# Patient Record
Sex: MALE | Race: BLACK OR AFRICAN AMERICAN | NOT HISPANIC OR LATINO | Employment: FULL TIME | URBAN - METROPOLITAN AREA
[De-identification: names, ages, dates, MRNs, and addresses within clinical notes are randomized per-mention and may not be internally consistent; named-entity substitution may affect disease eponyms.]

---

## 2022-05-09 ENCOUNTER — TELEPHONE (OUTPATIENT)
Dept: PULMONOLOGY | Facility: CLINIC | Age: 22
End: 2022-05-09
Payer: COMMERCIAL

## 2022-05-09 NOTE — TELEPHONE ENCOUNTER
----- Message from Melani Romero sent at 5/9/2022 12:41 PM CDT -----  Contact: @ [phone] Katt  Pt called in looking to make an appointment no dates available his complaint is shortness of breath please advise and call @ [phone] Katt

## 2022-05-09 NOTE — TELEPHONE ENCOUNTER
Spoke with patient mother (Ms Bolivar) informed her that I have received her message. I advised patient mother that we do not have any available appointments at this time but however, I will speak with physician to see if we can possibly squeeze patient appointment in. Patient mother verbalized that she understand.

## 2022-07-28 ENCOUNTER — TELEPHONE (OUTPATIENT)
Dept: INTERNAL MEDICINE | Facility: CLINIC | Age: 22
End: 2022-07-28
Payer: MEDICAID

## 2022-07-28 NOTE — TELEPHONE ENCOUNTER
Dr campo does not have a panel   She is a preceptor for residents   Mother will call to get a staff doctor

## 2022-08-24 ENCOUNTER — OFFICE VISIT (OUTPATIENT)
Dept: GASTROENTEROLOGY | Facility: CLINIC | Age: 22
End: 2022-08-24
Payer: COMMERCIAL

## 2022-08-24 ENCOUNTER — TELEPHONE (OUTPATIENT)
Dept: ENDOSCOPY | Facility: HOSPITAL | Age: 22
End: 2022-08-24
Payer: MEDICAID

## 2022-08-24 ENCOUNTER — LAB VISIT (OUTPATIENT)
Dept: LAB | Facility: HOSPITAL | Age: 22
End: 2022-08-24
Attending: INTERNAL MEDICINE
Payer: COMMERCIAL

## 2022-08-24 VITALS
BODY MASS INDEX: 21.66 KG/M2 | DIASTOLIC BLOOD PRESSURE: 79 MMHG | HEIGHT: 67 IN | WEIGHT: 138 LBS | SYSTOLIC BLOOD PRESSURE: 127 MMHG | HEART RATE: 62 BPM

## 2022-08-24 DIAGNOSIS — R19.7 DIARRHEA, UNSPECIFIED TYPE: Primary | ICD-10-CM

## 2022-08-24 DIAGNOSIS — R19.7 DIARRHEA, UNSPECIFIED TYPE: ICD-10-CM

## 2022-08-24 DIAGNOSIS — K92.1 HEMATOCHEZIA: ICD-10-CM

## 2022-08-24 LAB
ALBUMIN SERPL BCP-MCNC: 4.3 G/DL (ref 3.5–5.2)
ALP SERPL-CCNC: 62 U/L (ref 55–135)
ALT SERPL W/O P-5'-P-CCNC: 19 U/L (ref 10–44)
ANION GAP SERPL CALC-SCNC: 9 MMOL/L (ref 8–16)
AST SERPL-CCNC: 19 U/L (ref 10–40)
BASOPHILS # BLD AUTO: 0.04 K/UL (ref 0–0.2)
BASOPHILS NFR BLD: 0.7 % (ref 0–1.9)
BILIRUB SERPL-MCNC: 1.8 MG/DL (ref 0.1–1)
BUN SERPL-MCNC: 9 MG/DL (ref 6–20)
CALCIUM SERPL-MCNC: 9.7 MG/DL (ref 8.7–10.5)
CHLORIDE SERPL-SCNC: 101 MMOL/L (ref 95–110)
CO2 SERPL-SCNC: 29 MMOL/L (ref 23–29)
CREAT SERPL-MCNC: 1 MG/DL (ref 0.5–1.4)
DIFFERENTIAL METHOD: NORMAL
EOSINOPHIL # BLD AUTO: 0.3 K/UL (ref 0–0.5)
EOSINOPHIL NFR BLD: 5.4 % (ref 0–8)
ERYTHROCYTE [DISTWIDTH] IN BLOOD BY AUTOMATED COUNT: 13.1 % (ref 11.5–14.5)
EST. GFR  (NO RACE VARIABLE): >60 ML/MIN/1.73 M^2
GLUCOSE SERPL-MCNC: 78 MG/DL (ref 70–110)
HCT VFR BLD AUTO: 45.5 % (ref 40–54)
HGB BLD-MCNC: 15.6 G/DL (ref 14–18)
IMM GRANULOCYTES # BLD AUTO: 0.01 K/UL (ref 0–0.04)
IMM GRANULOCYTES NFR BLD AUTO: 0.2 % (ref 0–0.5)
LYMPHOCYTES # BLD AUTO: 1.2 K/UL (ref 1–4.8)
LYMPHOCYTES NFR BLD: 21.5 % (ref 18–48)
MCH RBC QN AUTO: 30.1 PG (ref 27–31)
MCHC RBC AUTO-ENTMCNC: 34.3 G/DL (ref 32–36)
MCV RBC AUTO: 88 FL (ref 82–98)
MONOCYTES # BLD AUTO: 0.6 K/UL (ref 0.3–1)
MONOCYTES NFR BLD: 10.2 % (ref 4–15)
NEUTROPHILS # BLD AUTO: 3.5 K/UL (ref 1.8–7.7)
NEUTROPHILS NFR BLD: 62 % (ref 38–73)
NRBC BLD-RTO: 0 /100 WBC
PLATELET # BLD AUTO: 234 K/UL (ref 150–450)
PMV BLD AUTO: 11 FL (ref 9.2–12.9)
POTASSIUM SERPL-SCNC: 4.1 MMOL/L (ref 3.5–5.1)
PROT SERPL-MCNC: 7.1 G/DL (ref 6–8.4)
RBC # BLD AUTO: 5.19 M/UL (ref 4.6–6.2)
SODIUM SERPL-SCNC: 139 MMOL/L (ref 136–145)
WBC # BLD AUTO: 5.58 K/UL (ref 3.9–12.7)

## 2022-08-24 PROCEDURE — 1160F RVW MEDS BY RX/DR IN RCRD: CPT | Mod: CPTII,S$GLB,, | Performed by: INTERNAL MEDICINE

## 2022-08-24 PROCEDURE — 3008F BODY MASS INDEX DOCD: CPT | Mod: CPTII,S$GLB,, | Performed by: INTERNAL MEDICINE

## 2022-08-24 PROCEDURE — 99999 PR PBB SHADOW E&M-EST. PATIENT-LVL IV: CPT | Mod: PBBFAC,,, | Performed by: INTERNAL MEDICINE

## 2022-08-24 PROCEDURE — 1160F PR REVIEW ALL MEDS BY PRESCRIBER/CLIN PHARMACIST DOCUMENTED: ICD-10-PCS | Mod: CPTII,S$GLB,, | Performed by: INTERNAL MEDICINE

## 2022-08-24 PROCEDURE — 99999 PR PBB SHADOW E&M-EST. PATIENT-LVL IV: ICD-10-PCS | Mod: PBBFAC,,, | Performed by: INTERNAL MEDICINE

## 2022-08-24 PROCEDURE — 80053 COMPREHEN METABOLIC PANEL: CPT | Performed by: INTERNAL MEDICINE

## 2022-08-24 PROCEDURE — 3078F DIAST BP <80 MM HG: CPT | Mod: CPTII,S$GLB,, | Performed by: INTERNAL MEDICINE

## 2022-08-24 PROCEDURE — 99204 PR OFFICE/OUTPT VISIT, NEW, LEVL IV, 45-59 MIN: ICD-10-PCS | Mod: S$GLB,,, | Performed by: INTERNAL MEDICINE

## 2022-08-24 PROCEDURE — 3078F PR MOST RECENT DIASTOLIC BLOOD PRESSURE < 80 MM HG: ICD-10-PCS | Mod: CPTII,S$GLB,, | Performed by: INTERNAL MEDICINE

## 2022-08-24 PROCEDURE — 3074F PR MOST RECENT SYSTOLIC BLOOD PRESSURE < 130 MM HG: ICD-10-PCS | Mod: CPTII,S$GLB,, | Performed by: INTERNAL MEDICINE

## 2022-08-24 PROCEDURE — 3008F PR BODY MASS INDEX (BMI) DOCUMENTED: ICD-10-PCS | Mod: CPTII,S$GLB,, | Performed by: INTERNAL MEDICINE

## 2022-08-24 PROCEDURE — 1159F MED LIST DOCD IN RCRD: CPT | Mod: CPTII,S$GLB,, | Performed by: INTERNAL MEDICINE

## 2022-08-24 PROCEDURE — 99204 OFFICE O/P NEW MOD 45 MIN: CPT | Mod: S$GLB,,, | Performed by: INTERNAL MEDICINE

## 2022-08-24 PROCEDURE — 1159F PR MEDICATION LIST DOCUMENTED IN MEDICAL RECORD: ICD-10-PCS | Mod: CPTII,S$GLB,, | Performed by: INTERNAL MEDICINE

## 2022-08-24 PROCEDURE — 36415 COLL VENOUS BLD VENIPUNCTURE: CPT | Performed by: INTERNAL MEDICINE

## 2022-08-24 PROCEDURE — 3074F SYST BP LT 130 MM HG: CPT | Mod: CPTII,S$GLB,, | Performed by: INTERNAL MEDICINE

## 2022-08-24 PROCEDURE — 85025 COMPLETE CBC W/AUTO DIFF WBC: CPT | Performed by: INTERNAL MEDICINE

## 2022-08-24 RX ORDER — DIPHENOXYLATE HYDROCHLORIDE AND ATROPINE SULFATE 2.5; .025 MG/1; MG/1
1 TABLET ORAL 4 TIMES DAILY PRN
COMMUNITY
Start: 2022-08-19 | End: 2022-10-13

## 2022-08-24 RX ORDER — COVID-19 MOLECULAR TEST ASSAY
KIT MISCELLANEOUS
COMMUNITY
Start: 2022-03-02 | End: 2022-09-15

## 2022-08-24 RX ORDER — CLINDAMYCIN AND BENZOYL PEROXIDE 10; 50 MG/G; MG/G
GEL TOPICAL DAILY
COMMUNITY
Start: 2022-07-06 | End: 2022-09-15

## 2022-08-24 RX ORDER — LISDEXAMFETAMINE DIMESYLATE 30 MG/1
CAPSULE ORAL
Status: ON HOLD | COMMUNITY
End: 2022-08-31 | Stop reason: HOSPADM

## 2022-08-24 NOTE — PROGRESS NOTES
"[facility]    CC: hematochezia    HPI 22 y.o. male who presents with 2-3 weeks of new onset, moderate volume, painless hematochezia with associated diarrhea.    About 3 weeks ago he began to have diarrhea.  After one week he began to notice blood in his stool.  Now at times it is a significant amount of blood passing without stool.  He has mild abdominal discomfort in his mid abdomen.  He has never experienced symptoms like this before.    He denies nausea, vomiting, melena, dysphagia.  He may have had a small amount of weight loss.    Past Medical History  History reviewed. No pertinent past medical history.     No pertinent past surgical history    No pertinent family history of colon cancer      Review of Systems  General ROS: negative for chills, fever .  Positive for possible weight loss  Cardiovascular ROS: no chest pain or dyspnea on exertion  Gastrointestinal ROS: positive for diarrhea, hematochezia, no nausea    Physical Examination  /79   Pulse 62   Ht 5' 7" (1.702 m)   Wt 62.6 kg (138 lb)   BMI 21.61 kg/m²   General appearance: alert, cooperative, no distress  HENT: Normocephalic, atraumatic, neck symmetrical, no nasal discharge   Lungs: clear to auscultation bilaterally, no dullness to percussion bilaterally  Heart: regular rate and rhythm without rub; no displacement of the PMI   Abdomen: soft, non-tender; bowel sounds normoactive; no organomegaly  Extremities: extremities symmetric; no clubbing, cyanosis, or edema  Neurologic: Alert and oriented X 3, normal strength, normal coordination and gait    Labs:  5/11/20 Vitamin D 33.0        Assessment:   1. Diarrhea, unspecified type    2. Hematochezia        Plan:  -Check stool studies.  -Check CBC, CMP, CRP  -Schedule urgent colonoscopy for further evaluation.  -Ok to take Lomotil as needed.  If stool studies are positive then he will need to discontinue this.    Rosi Echevarria MD    "

## 2022-08-24 NOTE — TELEPHONE ENCOUNTER
Spoke with patient's mother. Informed that we will try to get him in if there is a cancellation or no show.

## 2022-08-24 NOTE — TELEPHONE ENCOUNTER
----- Message from Jessica Keene MA sent at 8/24/2022  1:34 PM CDT -----  Regarding: FW: Pt's Mother Katt called to see if the pt can be sooner today and she would like a call back asap    ----- Message -----  From: Jeannette Juan  Sent: 8/24/2022   1:19 PM CDT  To: Wale Aranda Staff  Subject: Pt's Mother Katt called to see if the pt c#    Same Day Appointment Access Request:    Pt's Mother Katt called to see if the pt can be sooner today for rectal bleeding and stomach pain and she would like a call back asap. Pt states that he is going to come in now.    Ms Hamilton can be reached at [phone]

## 2022-08-25 ENCOUNTER — TELEPHONE (OUTPATIENT)
Dept: GASTROENTEROLOGY | Facility: CLINIC | Age: 22
End: 2022-08-25
Payer: MEDICAID

## 2022-08-25 ENCOUNTER — LAB VISIT (OUTPATIENT)
Dept: LAB | Facility: HOSPITAL | Age: 22
End: 2022-08-25
Attending: INTERNAL MEDICINE
Payer: COMMERCIAL

## 2022-08-25 DIAGNOSIS — R19.7 DIARRHEA, UNSPECIFIED TYPE: ICD-10-CM

## 2022-08-25 DIAGNOSIS — R17 TOTAL BILIRUBIN, ELEVATED: Primary | ICD-10-CM

## 2022-08-25 LAB
C DIFF GDH STL QL: NEGATIVE
C DIFF TOX A+B STL QL IA: NEGATIVE

## 2022-08-25 PROCEDURE — 87046 STOOL CULTR AEROBIC BACT EA: CPT | Mod: 59 | Performed by: INTERNAL MEDICINE

## 2022-08-25 PROCEDURE — 87329 GIARDIA AG IA: CPT | Performed by: INTERNAL MEDICINE

## 2022-08-25 PROCEDURE — 87427 SHIGA-LIKE TOXIN AG IA: CPT | Performed by: INTERNAL MEDICINE

## 2022-08-25 PROCEDURE — 87045 FECES CULTURE AEROBIC BACT: CPT | Performed by: INTERNAL MEDICINE

## 2022-08-25 PROCEDURE — 87449 NOS EACH ORGANISM AG IA: CPT | Performed by: INTERNAL MEDICINE

## 2022-08-25 PROCEDURE — 83993 ASSAY FOR CALPROTECTIN FECAL: CPT | Performed by: INTERNAL MEDICINE

## 2022-08-26 ENCOUNTER — TELEPHONE (OUTPATIENT)
Dept: GASTROENTEROLOGY | Facility: CLINIC | Age: 22
End: 2022-08-26
Payer: MEDICAID

## 2022-08-26 ENCOUNTER — TELEPHONE (OUTPATIENT)
Dept: ENDOSCOPY | Facility: HOSPITAL | Age: 22
End: 2022-08-26
Payer: MEDICAID

## 2022-08-26 ENCOUNTER — PATIENT MESSAGE (OUTPATIENT)
Dept: GASTROENTEROLOGY | Facility: CLINIC | Age: 22
End: 2022-08-26
Payer: MEDICAID

## 2022-08-26 DIAGNOSIS — R19.7 DIARRHEA, UNSPECIFIED TYPE: Primary | ICD-10-CM

## 2022-08-26 DIAGNOSIS — A07.2 GASTROENTERITIS DUE TO CRYPTOSPORIDIUM: ICD-10-CM

## 2022-08-26 LAB
CRYPTOSP AG STL QL IA: POSITIVE
E COLI SXT1 STL QL IA: NEGATIVE
E COLI SXT2 STL QL IA: NEGATIVE
G LAMBLIA AG STL QL IA: NEGATIVE

## 2022-08-26 RX ORDER — NITAZOXANIDE 500 MG/1
500 TABLET ORAL EVERY 12 HOURS
Qty: 6 TABLET | Refills: 0 | Status: SHIPPED | OUTPATIENT
Start: 2022-08-26 | End: 2022-08-29

## 2022-08-26 NOTE — TELEPHONE ENCOUNTER
[facility]    Cryptosporidium antigen positive.  Will treat with Nitazoxanide for 3 days.      Rosi Echevarria MD

## 2022-08-26 NOTE — TELEPHONE ENCOUNTER
----- Message from Christine Sanders LPN sent at 8/26/2022 11:34 AM CDT -----    ----- Message -----  From: Nidia Ltuher  Sent: 8/26/2022  10:44 AM CDT  To: Reji Aranda Staff    Name Of Caller: Katt     Provider Name: REJI Aranda     Does patient feel the need to be seen today?    Relationship to the Pt?: mom     Contact Preference?: [phone]    What is the nature of the call?:Mom called and would like to speak to office directly regarding appt 10/4/22 please call her back.     He had a positive culture yesterday what is next she is asking.

## 2022-08-26 NOTE — TELEPHONE ENCOUNTER
[facility]    Results and plan discussed with the patient and his mother over the phone.    Rosi Echevarria MD

## 2022-08-29 ENCOUNTER — TELEPHONE (OUTPATIENT)
Dept: GASTROENTEROLOGY | Facility: CLINIC | Age: 22
End: 2022-08-29
Payer: MEDICAID

## 2022-08-29 ENCOUNTER — TELEPHONE (OUTPATIENT)
Dept: ENDOSCOPY | Facility: HOSPITAL | Age: 22
End: 2022-08-29
Payer: MEDICAID

## 2022-08-29 ENCOUNTER — PATIENT MESSAGE (OUTPATIENT)
Dept: ENDOSCOPY | Facility: HOSPITAL | Age: 22
End: 2022-08-29
Payer: MEDICAID

## 2022-08-29 DIAGNOSIS — R19.7 DIARRHEA, UNSPECIFIED TYPE: ICD-10-CM

## 2022-08-29 DIAGNOSIS — Z12.11 SCREEN FOR COLON CANCER: Primary | ICD-10-CM

## 2022-08-29 DIAGNOSIS — K92.1 HEMATOCHEZIA: ICD-10-CM

## 2022-08-29 DIAGNOSIS — R19.7 DIARRHEA, UNSPECIFIED TYPE: Primary | ICD-10-CM

## 2022-08-29 LAB — BACTERIA STL CULT: NORMAL

## 2022-08-29 RX ORDER — POLYETHYLENE GLYCOL 3350, SODIUM SULFATE ANHYDROUS, SODIUM BICARBONATE, SODIUM CHLORIDE, POTASSIUM CHLORIDE 236; 22.74; 6.74; 5.86; 2.97 G/4L; G/4L; G/4L; G/4L; G/4L
4 POWDER, FOR SOLUTION ORAL ONCE
Qty: 4000 ML | Refills: 0 | Status: SHIPPED | OUTPATIENT
Start: 2022-08-29 | End: 2022-08-29

## 2022-08-29 NOTE — TELEPHONE ENCOUNTER
August 29, 2022  Rosi Echevarria MD  to Me        10:12 AM   Alexis Jaffe,     Yes that is correct.  I had planned for a colonoscopy due to his hematochezia and diarrhea.     Sincerely,     Rosi        GT    10:04 AM  You routed this conversation to Rosi Echevarria MD    Me     GT    10:04 AM  Note  [facility] Spoke to mother.  She informed me that she thought it was for an EGD, not a colonoscopy.  Please advise as to the requested procedure(s).  Thank you.     Alannah

## 2022-08-29 NOTE — TELEPHONE ENCOUNTER
[facility] Spoke to mother.  She informed me that she thought it was for an EGD, not a colonoscopy.  Please advise as to the requested procedure(s).  Thank you.    Alannah

## 2022-08-29 NOTE — TELEPHONE ENCOUNTER
----- Message from Tylor Bustos sent at 8/29/2022 11:35 AM CDT -----  Name Of Caller: Katt        Provider Name: Rosi Echevarria        Does patient feel the need to be seen today? no        Relationship to the Pt?: mother        Contact Preference?: [phone]        What is the nature of the call?: Patient's mother states that her son needs a note/letter for his job explaining why he had been off from work while he has been under the care of Dr Echevarria

## 2022-08-30 ENCOUNTER — ANESTHESIA EVENT (OUTPATIENT)
Dept: ENDOSCOPY | Facility: HOSPITAL | Age: 22
End: 2022-08-30
Payer: COMMERCIAL

## 2022-08-30 RX ORDER — SODIUM CHLORIDE 9 MG/ML
INJECTION, SOLUTION INTRAVENOUS CONTINUOUS
Status: CANCELLED | OUTPATIENT
Start: 2022-08-30

## 2022-08-30 RX ORDER — LIDOCAINE HYDROCHLORIDE 10 MG/ML
1 INJECTION, SOLUTION EPIDURAL; INFILTRATION; INTRACAUDAL; PERINEURAL ONCE
Status: CANCELLED | OUTPATIENT
Start: 2022-08-30 | End: 2022-08-30

## 2022-08-31 ENCOUNTER — ANESTHESIA (OUTPATIENT)
Dept: ENDOSCOPY | Facility: HOSPITAL | Age: 22
End: 2022-08-31
Payer: COMMERCIAL

## 2022-08-31 ENCOUNTER — TELEPHONE (OUTPATIENT)
Dept: GASTROENTEROLOGY | Facility: CLINIC | Age: 22
End: 2022-08-31
Payer: MEDICAID

## 2022-08-31 ENCOUNTER — HOSPITAL ENCOUNTER (OUTPATIENT)
Facility: HOSPITAL | Age: 22
Discharge: HOME | End: 2022-08-31
Attending: INTERNAL MEDICINE | Admitting: INTERNAL MEDICINE
Payer: COMMERCIAL

## 2022-08-31 VITALS
SYSTOLIC BLOOD PRESSURE: 114 MMHG | OXYGEN SATURATION: 100 % | DIASTOLIC BLOOD PRESSURE: 62 MMHG | TEMPERATURE: 98 F | RESPIRATION RATE: 17 BRPM | HEART RATE: 63 BPM

## 2022-08-31 DIAGNOSIS — R19.7 DIARRHEA: ICD-10-CM

## 2022-08-31 LAB
CALPROTECTIN STL-MCNT: 671.9 MCG/G
CTP QC/QA: YES
SARS-COV-2 AG RESP QL IA.RAPID: NEGATIVE

## 2022-08-31 PROCEDURE — D9220A PRA ANESTHESIA: ICD-10-PCS | Mod: ANES,,, | Performed by: ANESTHESIOLOGY

## 2022-08-31 PROCEDURE — 88305 TISSUE EXAM BY PATHOLOGIST: CPT | Mod: 26,,, | Performed by: PATHOLOGY

## 2022-08-31 PROCEDURE — 45380 COLONOSCOPY AND BIOPSY: CPT | Performed by: INTERNAL MEDICINE

## 2022-08-31 PROCEDURE — 88305 TISSUE EXAM BY PATHOLOGIST: ICD-10-PCS | Mod: 26,,, | Performed by: PATHOLOGY

## 2022-08-31 PROCEDURE — 37000008 HC ANESTHESIA 1ST 15 MINUTES: Performed by: INTERNAL MEDICINE

## 2022-08-31 PROCEDURE — 25000003 PHARM REV CODE 250: Performed by: INTERNAL MEDICINE

## 2022-08-31 PROCEDURE — 27201012 HC FORCEPS, HOT/COLD, DISP: Performed by: INTERNAL MEDICINE

## 2022-08-31 PROCEDURE — D9220A PRA ANESTHESIA: Mod: CRNA,,, | Performed by: NURSE ANESTHETIST, CERTIFIED REGISTERED

## 2022-08-31 PROCEDURE — D9220A PRA ANESTHESIA: ICD-10-PCS | Mod: CRNA,,, | Performed by: NURSE ANESTHETIST, CERTIFIED REGISTERED

## 2022-08-31 PROCEDURE — 63600175 PHARM REV CODE 636 W HCPCS: Performed by: NURSE ANESTHETIST, CERTIFIED REGISTERED

## 2022-08-31 PROCEDURE — 37000009 HC ANESTHESIA EA ADD 15 MINS: Performed by: INTERNAL MEDICINE

## 2022-08-31 PROCEDURE — D9220A PRA ANESTHESIA: Mod: ANES,,, | Performed by: ANESTHESIOLOGY

## 2022-08-31 PROCEDURE — 45380 COLONOSCOPY AND BIOPSY: CPT | Mod: ,,, | Performed by: INTERNAL MEDICINE

## 2022-08-31 PROCEDURE — 88305 TISSUE EXAM BY PATHOLOGIST: CPT | Performed by: PATHOLOGY

## 2022-08-31 PROCEDURE — 45380 PR COLONOSCOPY,BIOPSY: ICD-10-PCS | Mod: ,,, | Performed by: INTERNAL MEDICINE

## 2022-08-31 PROCEDURE — 25000003 PHARM REV CODE 250: Performed by: NURSE ANESTHETIST, CERTIFIED REGISTERED

## 2022-08-31 RX ORDER — PROPOFOL 10 MG/ML
VIAL (ML) INTRAVENOUS
Status: DISCONTINUED | OUTPATIENT
Start: 2022-08-31 | End: 2022-08-31

## 2022-08-31 RX ORDER — SODIUM CHLORIDE 9 MG/ML
INJECTION, SOLUTION INTRAVENOUS CONTINUOUS
Status: DISCONTINUED | OUTPATIENT
Start: 2022-08-31 | End: 2022-08-31 | Stop reason: HOSPADM

## 2022-08-31 RX ORDER — LIDOCAINE HYDROCHLORIDE 20 MG/ML
INJECTION, SOLUTION EPIDURAL; INFILTRATION; INTRACAUDAL; PERINEURAL
Status: DISCONTINUED
Start: 2022-08-31 | End: 2022-08-31 | Stop reason: HOSPADM

## 2022-08-31 RX ORDER — LIDOCAINE HYDROCHLORIDE 20 MG/ML
INJECTION INTRAVENOUS
Status: DISCONTINUED | OUTPATIENT
Start: 2022-08-31 | End: 2022-08-31

## 2022-08-31 RX ORDER — PROPOFOL 10 MG/ML
INJECTION, EMULSION INTRAVENOUS
Status: DISCONTINUED
Start: 2022-08-31 | End: 2022-08-31 | Stop reason: HOSPADM

## 2022-08-31 RX ADMIN — PROPOFOL 60 MG: 10 INJECTION, EMULSION INTRAVENOUS at 12:08

## 2022-08-31 RX ADMIN — LIDOCAINE HYDROCHLORIDE 100 MG: 20 INJECTION, SOLUTION INTRAVENOUS at 12:08

## 2022-08-31 RX ADMIN — SODIUM CHLORIDE: 0.9 INJECTION, SOLUTION INTRAVENOUS at 12:08

## 2022-08-31 RX ADMIN — PROPOFOL 20 MG: 10 INJECTION, EMULSION INTRAVENOUS at 12:08

## 2022-08-31 RX ADMIN — PROPOFOL 100 MG: 10 INJECTION, EMULSION INTRAVENOUS at 12:08

## 2022-08-31 RX ADMIN — PROPOFOL 40 MG: 10 INJECTION, EMULSION INTRAVENOUS at 12:08

## 2022-08-31 NOTE — ANESTHESIA PREPROCEDURE EVALUATION
08/31/2022  Erasmo Regalado is a 22 y.o., male.      Pre-op Assessment    I have reviewed the Patient Summary Reports.     I have reviewed the Nursing Notes.       Review of Systems  Anesthesia Hx:  No problems with previous Anesthesia  Denies Family Hx of Anesthesia complications.   Denies Personal Hx of Anesthesia complications.   Social:  No Alcohol Use, Non-Smoker    Cardiovascular:   Exercise tolerance: good Denies Hypertension.  Denies Dysrhythmias.   Denies Angina.    Pulmonary:  Pulmonary Normal    Renal/:  Renal/ Normal     Hepatic/GI:   hematochezia   Neurological:  Neurology Normal    Endocrine:  Endocrine Normal        Physical Exam  General: Well nourished, Cooperative, Oriented and Alert    Airway:  Mallampati: II   Mouth Opening: Normal  TM Distance: Normal  Tongue: Normal  Neck ROM: Normal ROM    Dental:  Intact    Chest/Lungs:  Clear to auscultation, Normal Respiratory Rate    Heart:  Rate: Normal  Rhythm: Regular Rhythm      Wt Readings from Last 3 Encounters:   08/24/22 62.6 kg (138 lb)   08/24/11 30.9 kg (68 lb 2.3 oz) (42 %, Z= -0.20)*     * Growth percentiles are based on [facility] (Boys, 2-20 Years) data.     Temp Readings from Last 3 Encounters:   No data found for Temp     BP Readings from Last 3 Encounters:   08/24/22 127/79   08/24/11 111/64 (90 %, Z = 1.28 /  62 %, Z = 0.31)*     *BP percentiles are based on the 2017 AAP Clinical Practice Guideline for boys     Pulse Readings from Last 3 Encounters:   08/24/22 62   08/24/11 95         Anesthesia Plan  Type of Anesthesia, risks & benefits discussed:    Anesthesia Type: Gen Natural Airway, MAC  Intra-op Monitoring Plan: Standard ASA Monitors  Post Op Pain Control Plan: multimodal analgesia  Induction:  IV  Informed Consent: Informed consent signed with the Patient and all parties understand the risks and agree with anesthesia plan.  All  questions answered.   ASA Score: 1  Day of Surgery Review of History & Physical: H&P Update referred to the surgeon/provider.    Ready For Surgery From Anesthesia Perspective.     .

## 2022-08-31 NOTE — TRANSFER OF CARE
Anesthesia Transfer of Care Note    Patient: Erasmo Regalado    Procedure(s) Performed: Procedure(s) (LRB):  COLONOSCOPY (N/A)    Patient location: GI    Anesthesia Type: general    Transport from OR: Transported from OR on room air with adequate spontaneous ventilation    Post pain: adequate analgesia    Post assessment: no apparent anesthetic complications and tolerated procedure well    Post vital signs: stable    Level of consciousness: sedated    Nausea/Vomiting: no nausea/vomiting    Complications: none    Transfer of care protocol was followed      Last vitals:   Visit Vitals  /68 (BP Location: Left arm, Patient Position: Lying)   Pulse 92   Temp 36.6 °C (97.9 °F) (Oral)   Resp 17   SpO2 97%

## 2022-08-31 NOTE — ANESTHESIA POSTPROCEDURE EVALUATION
Anesthesia Post Evaluation    Patient: Erasmo Regalado    Procedure(s) Performed: Procedure(s) (LRB):  COLONOSCOPY (N/A)    Final Anesthesia Type: general      Patient location during evaluation: GI PACU  Patient participation: Yes- Able to Participate  Level of consciousness: awake and alert  Post-procedure vital signs: reviewed and stable  Pain management: adequate  Airway patency: patent    PONV status at discharge: No PONV  Anesthetic complications: no      Cardiovascular status: hemodynamically stable  Respiratory status: unassisted and spontaneous ventilation  Hydration status: euvolemic  Follow-up not needed.          Vitals Value Taken Time   /65 08/31/22 1311   Temp 36.6 °C (97.9 °F) 08/31/22 1256   Pulse 76 08/31/22 1311   Resp 16 08/31/22 1311   SpO2 96 % 08/31/22 1311         No case tracking events are documented in the log.      Pain/Keo Score: Keo Score: 9 (8/31/2022  1:11 PM)

## 2022-08-31 NOTE — TELEPHONE ENCOUNTER
MA spoke with patient. Mr. Regalado is aware Dr. Echevarria provided a return to work letter and he can find in his portal under the letter tab.     Patient verbalized understanding and requested letter to be mailed also.

## 2022-08-31 NOTE — [employer] PATIENT INSTRUCTIONS
Discharge Summary/Instructions after an Endoscopic Procedure  Patient Name: Erasmo Regaldao  Patient MRN: [id number]  Patient YOB: 2000 Wednesday, August 31, 2022  Rosi Echevarria MD  Dear patient,  As a result of recent federal legislation (The Federal Cures Act), you may   receive lab or pathology results from your procedure in your [facility]   account before your physician is able to contact you. Your physician or   their representative will relay the results to you with their   recommendations at their soonest availability.  Thank you,  RESTRICTIONS:  During your procedure today, you received medications for sedation.  These   medications may affect your judgment, balance and coordination.  Therefore,   for 24 hours, you have the following restrictions:   - DO NOT drive a car, operate machinery, make legal/financial decisions,   sign important papers or drink alcohol.    ACTIVITY:  Today: no heavy lifting, straining or running due to procedural   sedation/anesthesia.  The following day: return to full activity including work.  DIET:  Eat and drink normally unless instructed otherwise.     TREATMENT FOR COMMON SIDE EFFECTS:  - Mild abdominal pain, nausea, belching, bloating or excessive gas:  rest,   eat lightly and use a heating pad.  - Sore Throat: treat with throat lozenges and/or gargle with warm salt   water.  - Because air was used during the procedure, expelling large amounts of air   from your rectum or belching is normal.  - If a bowel prep was taken, you may not have a bowel movement for 1-3 days.    This is normal.  SYMPTOMS TO WATCH FOR AND REPORT TO YOUR PHYSICIAN:  1. Abdominal pain or bloating, other than gas cramps.  2. Chest pain.  3. Back pain.  4. Signs of infection such as: chills or fever occurring within 24 hours   after the procedure.  5. Rectal bleeding, which would show as bright red, maroon, or black stools.   (A tablespoon of blood from the rectum is not serious, especially  if   hemorrhoids are present.)  6. Vomiting.  7. Weakness or dizziness.  GO DIRECTLY TO THE NEAREST EMERGENCY ROOM IF YOU HAVE ANY OF THE FOLLOWING:      Difficulty breathing              Chills and/or fever over 101 F   Persistent vomiting and/or vomiting blood   Severe abdominal pain   Severe chest pain   Black, tarry stools   Bleeding- more than one tablespoon   Any other symptom or condition that you feel may need urgent attention  Your doctor recommends these additional instructions:  If any biopsies were taken, your doctors clinic will contact you in 1 to 2   weeks with any results.  - Discharge patient to home.   - Resume previous diet.   - Continue present medications.   - Await pathology results.   - Repeat colonoscopy for surveillance based on pathology results.  For questions, problems or results please call your physician - Rosi Echevarria MD at Work:  [phone].  [facility] can be reached at [phone]     IF A COMPLICATION OR EMERGENCY SITUATION ARISES AND YOU ARE UNABLE TO REACH   YOUR PHYSICIAN - GO DIRECTLY TO THE EMERGENCY ROOM.  Rosi Echevarria MD  8/31/2022 1:05:47 PM  This report has been verified and signed electronically.  Dear patient,  As a result of recent federal legislation (The Federal Cures Act), you may   receive lab or pathology results from your procedure in your [facility]   account before your physician is able to contact you. Your physician or   their representative will relay the results to you with their   recommendations at their soonest availability.  Thank you,  [employer]ATION

## 2022-08-31 NOTE — TELEPHONE ENCOUNTER
----- Message from Rosi Echevarria MD sent at 8/31/2022  2:41 PM CDT -----  Regarding: Return to work letter  I did a return to work letter for Erasmo.  Are you able to see it?  Can you let Erasmo or his Mom or Dad know where they can find it on the portal?    Sincerely,    Rosi

## 2022-08-31 NOTE — PLAN OF CARE
Procedure and recovery complete. Awake and alert. No c/o pain or discomfort. Resp. Even and unlabored. Parents at bedside. Discharge instructions given. Verbalized understanding. Able to ambulate without assistance. No acute distress noted.

## 2022-09-04 ENCOUNTER — NURSE TRIAGE (OUTPATIENT)
Dept: ADMINISTRATIVE | Facility: CLINIC | Age: 22
End: 2022-09-04
Payer: MEDICAID

## 2022-09-04 NOTE — TELEPHONE ENCOUNTER
Pt had colonoscopy on 8/31 for bloody stool & frequent Bms.   Biopsy pending. Bloody stool has improved,only notices small traces or small amounts after wiping,  but still having very frequent BM's, -diarrhea, formed stool, sometimes 1 hr apart, sometimes immediately back to back. Pt has had 4 BM's today so far.     +intermittent lower abdominal pain, rates moderate states comes right before BM, then disappears. Says GI told him ok to return to work tomorrow but says there is no way he will be able to go to work having BM's the way he is.     Dispo-be seen wtihin 24 hrs, informed priority message to be routed to GI clinic requesting callback per pt request. Discussed UC & [facility] as alternative apt options. Advised to callback if symptoms change/worsen. Pt vu.     Reason for Disposition   [1] MILD-MODERATE abdomen pain (e.g., does not interfere with normal activities) AND [2] pain comes and goes (cramps) [3] lasting > 48 hours    Additional Information   Negative: Shock suspected (e.g., cold/pale/clammy skin, too weak to stand, low BP, rapid pulse)   Negative: Difficult to awaken or acting confused (e.g., disoriented, slurred speech)   Negative: Passed out (i.e., lost consciousness, collapsed and was not responding)   Negative: Sounds like a life-threatening emergency to the triager   Negative: SEVERE abdomen pain (e.g., excruciating)   Negative: SEVERE rectal bleeding (large blood clots; on and off, or constant bleeding)   Negative: SEVERE dizziness (e.g., unable to stand, requires support to walk, feels like passing out now)   Negative: SEVERE vomiting (e.g., 6 or more times/day)   Negative: [1] MODERATE rectal bleeding (small blood clots, passing blood without stool, or toilet water turns red) AND [2] more than once   Negative: [1] MILD-MODERATE abdomen pain AND [2] constant AND [3] present > 2 hours   Negative: [1] Drinking very little AND [2] dehydration suspected (e.g., no urine > 12 hours, very dry mouth, very  lightheaded)   Negative: Patient sounds very sick or weak to the triager   Negative: Fever > 100.4 F (38.0 C)   Negative: [1] Abdominal bloating, cramping, nausea, or vomiting while drinking bowel prep AND [2] CANNOT finish bowel prep AND [3] has tried recommended Care Advice   Negative: [1] Caller has URGENT question or concern AND [2] triager unable to answer question    Protocols used: Colonoscopy Symptoms and Sintsspvk-[id number]-SW

## 2022-09-06 ENCOUNTER — PATIENT MESSAGE (OUTPATIENT)
Dept: GASTROENTEROLOGY | Facility: CLINIC | Age: 22
End: 2022-09-06
Payer: MEDICAID

## 2022-09-07 ENCOUNTER — PATIENT MESSAGE (OUTPATIENT)
Dept: GASTROENTEROLOGY | Facility: CLINIC | Age: 22
End: 2022-09-07
Payer: MEDICAID

## 2022-09-07 LAB
FINAL PATHOLOGIC DIAGNOSIS: NORMAL
GROSS: NORMAL
Lab: NORMAL

## 2022-09-11 ENCOUNTER — PATIENT MESSAGE (OUTPATIENT)
Dept: GASTROENTEROLOGY | Facility: CLINIC | Age: 22
End: 2022-09-11
Payer: MEDICAID

## 2022-09-12 ENCOUNTER — PATIENT MESSAGE (OUTPATIENT)
Dept: GASTROENTEROLOGY | Facility: CLINIC | Age: 22
End: 2022-09-12
Payer: MEDICAID

## 2022-09-12 ENCOUNTER — TELEPHONE (OUTPATIENT)
Dept: GASTROENTEROLOGY | Facility: CLINIC | Age: 22
End: 2022-09-12
Payer: MEDICAID

## 2022-09-12 DIAGNOSIS — K50.10 CROHN'S DISEASE OF LARGE INTESTINE WITHOUT COMPLICATION: ICD-10-CM

## 2022-09-12 DIAGNOSIS — K63.9 DISEASE OF INTESTINE, UNSPECIFIED: Primary | ICD-10-CM

## 2022-09-12 RX ORDER — MESALAMINE 500 MG/1
1000 CAPSULE, EXTENDED RELEASE ORAL 4 TIMES DAILY
Qty: 240 CAPSULE | Refills: 11 | Status: SHIPPED | OUTPATIENT
Start: 2022-09-12 | End: 2022-10-13

## 2022-09-12 NOTE — TELEPHONE ENCOUNTER
[facility]    Colonoscopy results discussed with Erasmo and are consistent with colonic Crohn's disease.  I have ordered Mesalamine 1 gram 4 times daily, a CT enterography, labs to be drawn one month after starting mesalamine and IBD clinic follow up to be arranged.  He is feeling much better now.    Rosi Echevarria MD

## 2022-09-12 NOTE — TELEPHONE ENCOUNTER
[facility]    Biopsy results reviewed with the patient and are consistent with mild Crohn's disease of the colon.  We will start Mesalamine 1gram four times daily.    I will plan for labs-CBC, CMP one month after starting mesalamine.    CT enterography ordered to evaluate for small bowel Crohn's disease.    IBD clinic follow up to be requested.    Rosi Echevarria MD

## 2022-09-14 ENCOUNTER — TELEPHONE (OUTPATIENT)
Dept: GASTROENTEROLOGY | Facility: CLINIC | Age: 22
End: 2022-09-14
Payer: MEDICAID

## 2022-09-15 ENCOUNTER — OFFICE VISIT (OUTPATIENT)
Dept: PRIMARY CARE CLINIC | Facility: CLINIC | Age: 22
End: 2022-09-15
Payer: COMMERCIAL

## 2022-09-15 VITALS
OXYGEN SATURATION: 97 % | BODY MASS INDEX: 21.45 KG/M2 | DIASTOLIC BLOOD PRESSURE: 66 MMHG | HEIGHT: 67 IN | RESPIRATION RATE: 18 BRPM | WEIGHT: 136.69 LBS | SYSTOLIC BLOOD PRESSURE: 112 MMHG | HEART RATE: 73 BPM

## 2022-09-15 DIAGNOSIS — K52.9 COLITIS: ICD-10-CM

## 2022-09-15 DIAGNOSIS — Z13.220 SCREENING FOR HYPERLIPIDEMIA: Primary | ICD-10-CM

## 2022-09-15 DIAGNOSIS — Z11.59 NEED FOR HEPATITIS C SCREENING TEST: ICD-10-CM

## 2022-09-15 PROCEDURE — 1159F MED LIST DOCD IN RCRD: CPT | Mod: CPTII,S$GLB,, | Performed by: INTERNAL MEDICINE

## 2022-09-15 PROCEDURE — 3074F PR MOST RECENT SYSTOLIC BLOOD PRESSURE < 130 MM HG: ICD-10-PCS | Mod: CPTII,S$GLB,, | Performed by: INTERNAL MEDICINE

## 2022-09-15 PROCEDURE — 3078F PR MOST RECENT DIASTOLIC BLOOD PRESSURE < 80 MM HG: ICD-10-PCS | Mod: CPTII,S$GLB,, | Performed by: INTERNAL MEDICINE

## 2022-09-15 PROCEDURE — 1160F PR REVIEW ALL MEDS BY PRESCRIBER/CLIN PHARMACIST DOCUMENTED: ICD-10-PCS | Mod: CPTII,S$GLB,, | Performed by: INTERNAL MEDICINE

## 2022-09-15 PROCEDURE — 99213 OFFICE O/P EST LOW 20 MIN: CPT | Mod: S$GLB,,, | Performed by: INTERNAL MEDICINE

## 2022-09-15 PROCEDURE — 3078F DIAST BP <80 MM HG: CPT | Mod: CPTII,S$GLB,, | Performed by: INTERNAL MEDICINE

## 2022-09-15 PROCEDURE — 1159F PR MEDICATION LIST DOCUMENTED IN MEDICAL RECORD: ICD-10-PCS | Mod: CPTII,S$GLB,, | Performed by: INTERNAL MEDICINE

## 2022-09-15 PROCEDURE — 1160F RVW MEDS BY RX/DR IN RCRD: CPT | Mod: CPTII,S$GLB,, | Performed by: INTERNAL MEDICINE

## 2022-09-15 PROCEDURE — 99999 PR PBB SHADOW E&M-EST. PATIENT-LVL IV: CPT | Mod: PBBFAC,,, | Performed by: INTERNAL MEDICINE

## 2022-09-15 PROCEDURE — 3074F SYST BP LT 130 MM HG: CPT | Mod: CPTII,S$GLB,, | Performed by: INTERNAL MEDICINE

## 2022-09-15 PROCEDURE — 3008F BODY MASS INDEX DOCD: CPT | Mod: CPTII,S$GLB,, | Performed by: INTERNAL MEDICINE

## 2022-09-15 PROCEDURE — 99213 PR OFFICE/OUTPT VISIT, EST, LEVL III, 20-29 MIN: ICD-10-PCS | Mod: S$GLB,,, | Performed by: INTERNAL MEDICINE

## 2022-09-15 PROCEDURE — 99999 PR PBB SHADOW E&M-EST. PATIENT-LVL IV: ICD-10-PCS | Mod: PBBFAC,,, | Performed by: INTERNAL MEDICINE

## 2022-09-15 PROCEDURE — 3008F PR BODY MASS INDEX (BMI) DOCUMENTED: ICD-10-PCS | Mod: CPTII,S$GLB,, | Performed by: INTERNAL MEDICINE

## 2022-09-15 PROCEDURE — 99214 OFFICE O/P EST MOD 30 MIN: CPT | Mod: PBBFAC,PN | Performed by: INTERNAL MEDICINE

## 2022-09-16 ENCOUNTER — TELEPHONE (OUTPATIENT)
Dept: GASTROENTEROLOGY | Facility: CLINIC | Age: 22
End: 2022-09-16
Payer: MEDICAID

## 2022-09-18 ENCOUNTER — PATIENT MESSAGE (OUTPATIENT)
Dept: PRIMARY CARE CLINIC | Facility: CLINIC | Age: 22
End: 2022-09-18
Payer: MEDICAID

## 2022-09-18 NOTE — PROGRESS NOTES
Subjective:       Patient ID: Erasmo Regalado is a 22 y.o. male.    Chief Complaint: Establish Care (Transferring to a regular PCP from Pediatric ) and Crohn's Disease (Occasional abdominal pain)    HPI  Pt visit today for F/U he had h/o bloody diarrhea  couple of weeks ago he was seen by GI had colonoscopy that showed moertae inflammation in sigmoid colon path report moderate inflammation with cryptitis pt was prescribed mesalamin but has not taken it yet he is scheduled for EGD stool study negative except sporodium pt is clinically better diarrhea much better no blood no mucous no abd pian no fever chill ski rash joint pian etc pt works for [employer] . He denies any PMH or PSH no smoking no ETOH single F/H unremarkable both parents are with pt today  Review of Systems   Constitutional:  Negative for unexpected weight change.   Respiratory:  Negative for shortness of breath.    Cardiovascular:  Negative for chest pain.   Gastrointestinal:  Negative for abdominal pain.   Musculoskeletal:  Negative for arthralgias.   Psychiatric/Behavioral:  Negative for dysphoric mood.      Objective:      Physical Exam  Vitals and nursing note reviewed.   Constitutional:       General: He is not in acute distress.     Appearance: He is well-developed.   HENT:      Head: Normocephalic and atraumatic.      Right Ear: External ear normal.      Left Ear: External ear normal.      Nose: Nose normal.      Mouth/Throat:      Pharynx: No oropharyngeal exudate.   Eyes:      Extraocular Movements: Extraocular movements intact.      Conjunctiva/sclera: Conjunctivae normal.      Pupils: Pupils are equal, round, and reactive to light.   Neck:      Thyroid: No thyromegaly.   Cardiovascular:      Rate and Rhythm: Normal rate and regular rhythm.      Heart sounds: Normal heart sounds. No murmur heard.    No friction rub. No gallop.   Pulmonary:      Effort: Pulmonary effort is normal. No respiratory distress.      Breath sounds: Normal breath sounds. No  wheezing.   Abdominal:      General: Bowel sounds are normal. There is no distension.      Palpations: Abdomen is soft.      Tenderness: There is no abdominal tenderness.   Musculoskeletal:         General: No tenderness or deformity. Normal range of motion.      Cervical back: Normal range of motion and neck supple.   Lymphadenopathy:      Cervical: No cervical adenopathy.   Skin:     General: Skin is warm and dry.      Capillary Refill: Capillary refill takes less than 2 seconds.      Findings: No erythema or rash.   Neurological:      Mental Status: He is alert and oriented to person, place, and time.   Psychiatric:         Thought Content: Thought content normal.         Judgment: Judgment normal.       Assessment:       1. Screening for hyperlipidemia    2. Need for hepatitis C screening test    3. Colitis          Plan:       Screening for hyperlipidemia  -     Hepatitis C Antibody; Future; Expected date: 09/15/2022  -     Lipid Panel; Future; Expected date: 09/15/2022    Need for hepatitis C screening test  -     Lipid Panel; Future; Expected date: 09/15/2022    Colitis  Comments:  pt's symptoms seems improve without any treatments except symptomatic continue iwth mesalamin and f/u with GI may need repeat colonscopy to evaluate after tx  Orders:  -     CBC Auto Differential; Future; Expected date: 09/15/2022  -     Comprehensive Metabolic Panel; Future; Expected date: 09/15/2022  -     Lipid Panel; Future; Expected date: 09/15/2022  -     Sedimentation rate; Future; Expected date: 09/15/2022  -     C-reactive protein; Future; Expected date: 09/15/2022  -     HIV 1/2 Ag/Ab (4th Gen); Future; Expected date: 09/15/2022  -     JEFF Screen w/Reflex; Future; Expected date: 09/15/2022  -     Urinalysis; Future; Expected date: 09/15/2022      Medication List with Changes/Refills   Current Medications    DIPHENOXYLATE-ATROPINE 2.5-0.025 MG (LOMOTIL) 2.5-0.025 MG PER TABLET    Take 1 tablet by mouth 4 (four) times daily  as needed.    MESALAMINE (PENTASA) 500 MG CR CAPSULE    Take 2 capsules (1,000 mg total) by mouth 4 (four) times daily.   Discontinued Medications    CLINDAMYCIN-BENZOYL PEROXIDE (BENZACLIN) GEL    Apply topically once daily.    ID NOW COVID-19 TEST KIT KIT    TEST AS DIRECTED TODAY

## 2022-09-19 ENCOUNTER — PATIENT MESSAGE (OUTPATIENT)
Dept: GASTROENTEROLOGY | Facility: CLINIC | Age: 22
End: 2022-09-19
Payer: MEDICAID

## 2022-09-21 DIAGNOSIS — R17 SERUM TOTAL BILIRUBIN ELEVATED: Primary | ICD-10-CM

## 2022-09-27 ENCOUNTER — TELEPHONE (OUTPATIENT)
Dept: HEPATOLOGY | Facility: CLINIC | Age: 22
End: 2022-09-27
Payer: MEDICAID

## 2022-09-27 ENCOUNTER — PATIENT MESSAGE (OUTPATIENT)
Dept: PRIMARY CARE CLINIC | Facility: CLINIC | Age: 22
End: 2022-09-27
Payer: MEDICAID

## 2022-10-04 ENCOUNTER — TELEPHONE (OUTPATIENT)
Dept: GASTROENTEROLOGY | Facility: CLINIC | Age: 22
End: 2022-10-04
Payer: MEDICAID

## 2022-10-05 ENCOUNTER — PATIENT MESSAGE (OUTPATIENT)
Dept: GASTROENTEROLOGY | Facility: CLINIC | Age: 22
End: 2022-10-05
Payer: MEDICAID

## 2022-10-05 ENCOUNTER — PATIENT MESSAGE (OUTPATIENT)
Dept: PRIMARY CARE CLINIC | Facility: CLINIC | Age: 22
End: 2022-10-05
Payer: MEDICAID

## 2022-10-07 ENCOUNTER — PATIENT MESSAGE (OUTPATIENT)
Dept: GASTROENTEROLOGY | Facility: CLINIC | Age: 22
End: 2022-10-07
Payer: MEDICAID

## 2022-10-07 DIAGNOSIS — R19.7 DIARRHEA, UNSPECIFIED TYPE: Primary | ICD-10-CM

## 2022-10-11 ENCOUNTER — PATIENT MESSAGE (OUTPATIENT)
Dept: HEPATOLOGY | Facility: CLINIC | Age: 22
End: 2022-10-11
Payer: MEDICAID

## 2022-10-12 ENCOUNTER — PATIENT MESSAGE (OUTPATIENT)
Dept: GASTROENTEROLOGY | Facility: CLINIC | Age: 22
End: 2022-10-12
Payer: MEDICAID

## 2022-10-12 NOTE — TELEPHONE ENCOUNTER
Called & spoke to pt  - Reviewed purpose of new patient appt scheduled for 10/13/22  - Pt expressed understanding

## 2022-10-12 NOTE — TELEPHONE ENCOUNTER
Returned call to pt's mom. Mom would like Mr. Zimmerman's appointment to moved tomorrow. Rescheduled appointment to Gail Nunez NP since Ms. Morejon will be in [place] on that day.

## 2022-10-13 ENCOUNTER — OFFICE VISIT (OUTPATIENT)
Dept: HEPATOLOGY | Facility: CLINIC | Age: 22
End: 2022-10-13
Payer: COMMERCIAL

## 2022-10-13 ENCOUNTER — LAB VISIT (OUTPATIENT)
Dept: LAB | Facility: HOSPITAL | Age: 22
End: 2022-10-13
Payer: COMMERCIAL

## 2022-10-13 VITALS
BODY MASS INDEX: 25.6 KG/M2 | SYSTOLIC BLOOD PRESSURE: 125 MMHG | HEART RATE: 83 BPM | DIASTOLIC BLOOD PRESSURE: 82 MMHG | WEIGHT: 163.13 LBS | RESPIRATION RATE: 18 BRPM | HEIGHT: 67 IN | OXYGEN SATURATION: 96 % | TEMPERATURE: 97 F

## 2022-10-13 DIAGNOSIS — R17 SERUM TOTAL BILIRUBIN ELEVATED: ICD-10-CM

## 2022-10-13 LAB
ALBUMIN SERPL BCP-MCNC: 4.2 G/DL (ref 3.5–5.2)
ALP SERPL-CCNC: 77 U/L (ref 55–135)
ALT SERPL W/O P-5'-P-CCNC: 30 U/L (ref 10–44)
AST SERPL-CCNC: 27 U/L (ref 10–40)
BILIRUB DIRECT SERPL-MCNC: 0.4 MG/DL (ref 0.1–0.3)
BILIRUB SERPL-MCNC: 1 MG/DL (ref 0.1–1)
ERYTHROCYTE [DISTWIDTH] IN BLOOD BY AUTOMATED COUNT: 13.2 % (ref 11.5–14.5)
FERRITIN SERPL-MCNC: 21 NG/ML (ref 20–300)
HAPTOGLOB SERPL-MCNC: 126 MG/DL (ref 30–250)
HBV SURFACE AG SERPL QL IA: NORMAL
HCT VFR BLD AUTO: 45.4 % (ref 40–54)
HGB BLD-MCNC: 15.1 G/DL (ref 14–18)
IRON SERPL-MCNC: 53 UG/DL (ref 45–160)
LDH SERPL L TO P-CCNC: 172 U/L (ref 110–260)
MCH RBC QN AUTO: 30.3 PG (ref 27–31)
MCHC RBC AUTO-ENTMCNC: 33.3 G/DL (ref 32–36)
MCV RBC AUTO: 91 FL (ref 82–98)
PLATELET # BLD AUTO: 227 K/UL (ref 150–450)
PMV BLD AUTO: 10.9 FL (ref 9.2–12.9)
PROT SERPL-MCNC: 7.2 G/DL (ref 6–8.4)
RBC # BLD AUTO: 4.98 M/UL (ref 4.6–6.2)
RETICS/RBC NFR AUTO: 2.1 % (ref 0.4–2)
SATURATED IRON: 14 % (ref 20–50)
TOTAL IRON BINDING CAPACITY: 383 UG/DL (ref 250–450)
TRANSFERRIN SERPL-MCNC: 259 MG/DL (ref 200–375)
WBC # BLD AUTO: 6.21 K/UL (ref 3.9–12.7)

## 2022-10-13 PROCEDURE — 84466 ASSAY OF TRANSFERRIN: CPT | Performed by: NURSE PRACTITIONER

## 2022-10-13 PROCEDURE — 83615 LACTATE (LD) (LDH) ENZYME: CPT | Performed by: NURSE PRACTITIONER

## 2022-10-13 PROCEDURE — 81350 UGT1A1 GENE COMMON VARIANTS: CPT | Performed by: NURSE PRACTITIONER

## 2022-10-13 PROCEDURE — 99203 PR OFFICE/OUTPT VISIT, NEW, LEVL III, 30-44 MIN: ICD-10-PCS | Mod: S$GLB,,, | Performed by: NURSE PRACTITIONER

## 2022-10-13 PROCEDURE — 82728 ASSAY OF FERRITIN: CPT | Performed by: NURSE PRACTITIONER

## 2022-10-13 PROCEDURE — 99999 PR PBB SHADOW E&M-EST. PATIENT-LVL IV: CPT | Mod: PBBFAC,,, | Performed by: NURSE PRACTITIONER

## 2022-10-13 PROCEDURE — 1159F MED LIST DOCD IN RCRD: CPT | Mod: CPTII,S$GLB,, | Performed by: NURSE PRACTITIONER

## 2022-10-13 PROCEDURE — 80076 HEPATIC FUNCTION PANEL: CPT | Performed by: NURSE PRACTITIONER

## 2022-10-13 PROCEDURE — 3074F SYST BP LT 130 MM HG: CPT | Mod: CPTII,S$GLB,, | Performed by: NURSE PRACTITIONER

## 2022-10-13 PROCEDURE — 3079F DIAST BP 80-89 MM HG: CPT | Mod: CPTII,S$GLB,, | Performed by: NURSE PRACTITIONER

## 2022-10-13 PROCEDURE — 1159F PR MEDICATION LIST DOCUMENTED IN MEDICAL RECORD: ICD-10-PCS | Mod: CPTII,S$GLB,, | Performed by: NURSE PRACTITIONER

## 2022-10-13 PROCEDURE — 83010 ASSAY OF HAPTOGLOBIN QUANT: CPT | Performed by: NURSE PRACTITIONER

## 2022-10-13 PROCEDURE — 99203 OFFICE O/P NEW LOW 30 MIN: CPT | Mod: S$GLB,,, | Performed by: NURSE PRACTITIONER

## 2022-10-13 PROCEDURE — 36415 COLL VENOUS BLD VENIPUNCTURE: CPT | Performed by: NURSE PRACTITIONER

## 2022-10-13 PROCEDURE — 85045 AUTOMATED RETICULOCYTE COUNT: CPT | Performed by: NURSE PRACTITIONER

## 2022-10-13 PROCEDURE — 99999 PR PBB SHADOW E&M-EST. PATIENT-LVL IV: ICD-10-PCS | Mod: PBBFAC,,, | Performed by: NURSE PRACTITIONER

## 2022-10-13 PROCEDURE — 85027 COMPLETE CBC AUTOMATED: CPT | Performed by: NURSE PRACTITIONER

## 2022-10-13 PROCEDURE — 3079F PR MOST RECENT DIASTOLIC BLOOD PRESSURE 80-89 MM HG: ICD-10-PCS | Mod: CPTII,S$GLB,, | Performed by: NURSE PRACTITIONER

## 2022-10-13 PROCEDURE — 3074F PR MOST RECENT SYSTOLIC BLOOD PRESSURE < 130 MM HG: ICD-10-PCS | Mod: CPTII,S$GLB,, | Performed by: NURSE PRACTITIONER

## 2022-10-13 PROCEDURE — 87340 HEPATITIS B SURFACE AG IA: CPT | Performed by: NURSE PRACTITIONER

## 2022-10-13 NOTE — PROGRESS NOTES
[facility] - New Patient     REFERRING PROVIDER: Dr. Rosi Echevarria  PCP: Yousif Shultz MD    Chief Complaint: Elevated bilirubin        HISTORY     This is a 22 y.o. male referred for evaluation of elevated bilirubin.  He is here today with mother.    No known history of liver disease.    Review of labs:  - Transaminases: WNL  - Alk phos: WNL  - Synthetic liver function: TBili mildly elevated x 2 (1.7-1.8), otherwise WNL  - Platelets: WNL    Workup thus far:  Serologies:   Lab Results   Component Value Date    ANASCREEN Negative <1:80 09/15/2022    FERRITIN 21 10/13/2022    FESATURATED 14 (L) 10/13/2022    HEPCAB Non-reactive 09/15/2022      CT 9/22/22- unremarkable liver    No risk factors for fatty liver.  Does not drink alcohol     Family history:  No family history of liver disease.    Meds:  -Rx: none  -OTC, herbs/supplements: none  No recent medication changes.     He has recently undergone workup with GI after having bloody diarrhea x 3 weeks. There was initially a concern for Crohn's though he reports diagnosis has been unclear. Symptoms have since resolved.           Past medical history, surgical history, problem list, family history, social history, allergies: Reviewed and updated in the appropriate section of the electronic medical record.      No current outpatient medications on file.     No current facility-administered medications for this visit.     Medication list reviewed and updated.      Review of Systems   Constitutional: Negative for fatigue or unexpected weight change.   Respiratory: Negative for shortness of breath.    Cardiovascular: Negative for leg swelling.  Gastrointestinal: Negative for abdominal distention, abdominal pain, diarrhea, nausea, and vomiting. Negative for blood in stool, melena, or hematemesis.  Musculoskeletal: Negative for myalgias.    Skin: Negative for itching.  Neurological: Negative for dizziness or tremors. Negative for confusion, slowed mentation,  "or memory loss.   Hematological: Does not bruise/bleed easily.   Psychiatric: Negative for mood changes or sleep disturbance.      Physical Exam   Constitutional: Well-nourished. No distress. Alert and oriented.  Eyes: No scleral icterus.   Pulmonary/Chest: Respiratory effort normal. No respiratory distress.   Abdominal: No distension, no ascites appreciated.   Extremities: No edema.   Neurological: No tremor or asterixis. Gait normal.  Skin: No jaundice. No spider telangiectasias or palmar erythema.  Psychiatric: Normal mood and affect. Speech, behavior, and thought content normal. No depression or anxiety noted.       Vitals reviewed.  /82 (BP Location: Right arm, Patient Position: Sitting, BP Method: Medium (Automatic))   Pulse 83   Temp 96.7 °F (35.9 °C) (Oral)   Resp 18   Ht 5' 7" (1.702 m)   Wt 74 kg (163 lb 2.3 oz)   SpO2 96%   BMI 25.55 kg/m²         LABS & DIAGNOSTIC STUDIES     I have personally reviewed pertinent laboratory findings:    Lab Results   Component Value Date    ALT 30 10/13/2022    AST 27 10/13/2022    ALKPHOS 77 10/13/2022    BILITOT 1.0 10/13/2022    ALBUMIN 4.2 10/13/2022       Lab Results   Component Value Date    WBC 6.21 10/13/2022    HGB 15.1 10/13/2022    HCT 45.4 10/13/2022    MCV 91 10/13/2022     10/13/2022       Lab Results   Component Value Date     09/15/2022    K 5.2 (H) 09/15/2022    BUN 13 09/15/2022    CREATININE 1.1 09/15/2022       Lab Results   Component Value Date    ANASCREEN Negative <1:80 09/15/2022    FERRITIN 21 10/13/2022    FESATURATED 14 (L) 10/13/2022    HEPCAB Non-reactive 09/15/2022    [id number] Non-reactive 09/15/2022       No results found for: AFP    I have personally reviewed the following result reports:  CT - 9/22/22        ASSESSMENT & PLAN     22 y.o. male with:    1. Elevated bilirubin  -- Labs reviewed with patient. He has a mildly elevated bilirubin x 2 though liver enzymes and synthetic liver function otherwise normal. " Repeat bilirubin and fractionate with next labs.  -- May have Gilbert's, will check testing for this. Reassured that if confirmed, this is a benign genetic variation that will not cause liver problems.   -- No anemia though can rule out hemolysis  -- Routine liver labs: screen for hep B, iron studies  -- May have been a transient increase associated with his recent GI illness      Orders Placed This Encounter   Procedures    Hepatic Function Panel    Haptoglobin    Lactate Dehydrogenase    Reticulocytes    UGT1A1 Genotype    Hepatitis B Surface Antigen    Ferritin    Iron and TIBC    CBC Without Differential       *See AVS for patient education and instructions.       Return to clinic pending above results.      Thank you for allowing me to participate in the care of Erasmo Fabricio Nunez, STEFP-C  Hepatology        Duration of encounter: 30 min  This includes face to face time and non-face to face time preparing to see the patient (eg, review of tests), obtaining and/or reviewing separately obtained history, documenting clinical information in the electronic or other health record, independently interpreting results and communicating results to the patient/family/caregiver, or Care coordination.

## 2022-10-13 NOTE — TELEPHONE ENCOUNTER
Called & spoke to pt's mom  - Reviewed recent CT shows no evidence of Crohn's in the small intestine though the colonoscopy & path shows Crohn's of the large intestine  - Reviewed message sent by Dr. Echevarria on 9/13/22 informing pt of diagnosis & timing of when we reached out to the pt to schedule appt w/ Dr. Braswell  - Confirmed pt will still be coming to NP appt today

## 2022-10-14 ENCOUNTER — PATIENT MESSAGE (OUTPATIENT)
Dept: GASTROENTEROLOGY | Facility: CLINIC | Age: 22
End: 2022-10-14
Payer: MEDICAID

## 2022-10-17 LAB
ANNOTATION COMMENT IMP: NORMAL
GENETICIST REVIEW: NORMAL
PROVIDER SIGNING NAME: NORMAL
TEST PERFORMANCE INFO SPEC: NORMAL
UGT1A1 GENE PROD MET ACT IMP BLD/T-IMP: NORMAL
UGT1A1 GENOTYPE: NORMAL
UGT1A1 METHOD: NORMAL

## 2022-10-24 ENCOUNTER — PATIENT MESSAGE (OUTPATIENT)
Dept: HEPATOLOGY | Facility: CLINIC | Age: 22
End: 2022-10-24
Payer: MEDICAID

## 2022-10-24 PROBLEM — E80.4 GILBERT'S SYNDROME: Status: ACTIVE | Noted: 2022-10-24

## 2022-12-05 ENCOUNTER — PATIENT MESSAGE (OUTPATIENT)
Dept: PRIMARY CARE CLINIC | Facility: CLINIC | Age: 22
End: 2022-12-05
Payer: MEDICAID

## 2023-05-25 ENCOUNTER — OFFICE VISIT (OUTPATIENT)
Dept: PRIMARY CARE CLINIC | Facility: CLINIC | Age: 23
End: 2023-05-25
Payer: COMMERCIAL

## 2023-05-25 VITALS
OXYGEN SATURATION: 95 % | SYSTOLIC BLOOD PRESSURE: 110 MMHG | DIASTOLIC BLOOD PRESSURE: 66 MMHG | HEART RATE: 96 BPM | HEIGHT: 67 IN | WEIGHT: 146.38 LBS | RESPIRATION RATE: 18 BRPM | BODY MASS INDEX: 22.98 KG/M2

## 2023-05-25 DIAGNOSIS — E80.4 GILBERT'S SYNDROME: Primary | ICD-10-CM

## 2023-05-25 DIAGNOSIS — Z11.59 NEED FOR HEPATITIS C SCREENING TEST: ICD-10-CM

## 2023-05-25 DIAGNOSIS — K52.9 COLITIS: ICD-10-CM

## 2023-05-25 PROCEDURE — 1159F PR MEDICATION LIST DOCUMENTED IN MEDICAL RECORD: ICD-10-PCS | Mod: CPTII,S$GLB,, | Performed by: INTERNAL MEDICINE

## 2023-05-25 PROCEDURE — 1160F PR REVIEW ALL MEDS BY PRESCRIBER/CLIN PHARMACIST DOCUMENTED: ICD-10-PCS | Mod: CPTII,S$GLB,, | Performed by: INTERNAL MEDICINE

## 2023-05-25 PROCEDURE — 3078F PR MOST RECENT DIASTOLIC BLOOD PRESSURE < 80 MM HG: ICD-10-PCS | Mod: CPTII,S$GLB,, | Performed by: INTERNAL MEDICINE

## 2023-05-25 PROCEDURE — 99999 PR PBB SHADOW E&M-EST. PATIENT-LVL III: CPT | Mod: PBBFAC,,, | Performed by: INTERNAL MEDICINE

## 2023-05-25 PROCEDURE — 99999 PR PBB SHADOW E&M-EST. PATIENT-LVL III: ICD-10-PCS | Mod: PBBFAC,,, | Performed by: INTERNAL MEDICINE

## 2023-05-25 PROCEDURE — 3008F PR BODY MASS INDEX (BMI) DOCUMENTED: ICD-10-PCS | Mod: CPTII,S$GLB,, | Performed by: INTERNAL MEDICINE

## 2023-05-25 PROCEDURE — 3074F SYST BP LT 130 MM HG: CPT | Mod: CPTII,S$GLB,, | Performed by: INTERNAL MEDICINE

## 2023-05-25 PROCEDURE — 3078F DIAST BP <80 MM HG: CPT | Mod: CPTII,S$GLB,, | Performed by: INTERNAL MEDICINE

## 2023-05-25 PROCEDURE — 99213 OFFICE O/P EST LOW 20 MIN: CPT | Mod: S$GLB,,, | Performed by: INTERNAL MEDICINE

## 2023-05-25 PROCEDURE — 3008F BODY MASS INDEX DOCD: CPT | Mod: CPTII,S$GLB,, | Performed by: INTERNAL MEDICINE

## 2023-05-25 PROCEDURE — 99213 PR OFFICE/OUTPT VISIT, EST, LEVL III, 20-29 MIN: ICD-10-PCS | Mod: S$GLB,,, | Performed by: INTERNAL MEDICINE

## 2023-05-25 PROCEDURE — 1159F MED LIST DOCD IN RCRD: CPT | Mod: CPTII,S$GLB,, | Performed by: INTERNAL MEDICINE

## 2023-05-25 PROCEDURE — 3074F PR MOST RECENT SYSTOLIC BLOOD PRESSURE < 130 MM HG: ICD-10-PCS | Mod: CPTII,S$GLB,, | Performed by: INTERNAL MEDICINE

## 2023-05-25 PROCEDURE — 1160F RVW MEDS BY RX/DR IN RCRD: CPT | Mod: CPTII,S$GLB,, | Performed by: INTERNAL MEDICINE

## 2023-05-25 NOTE — PROGRESS NOTES
Subjective:       Patient ID: Erasmo Regalado is a 22 y.o. male.    Chief Complaint: Annual Exam    HPI  pt visit today for f/u he was diagnosed with crohn ds by scope and bx had bloody stool abd pain pt is doing well currently not taking any mediaction pt try cntrol his symptoms by diet avoid food that make the symptoms recur so far he is doing well no symptoms no abd pain diarrhea blood in stool no joint pain no skin rash pt does not smoke or drink etoh  Review of Systems    Objective:      Physical Exam  Vitals and nursing note reviewed.   Constitutional:       General: He is not in acute distress.     Appearance: He is well-developed.   HENT:      Head: Normocephalic and atraumatic.      Right Ear: External ear normal.      Left Ear: External ear normal.      Nose: Nose normal.      Mouth/Throat:      Pharynx: No oropharyngeal exudate.   Eyes:      Extraocular Movements: Extraocular movements intact.      Conjunctiva/sclera: Conjunctivae normal.      Pupils: Pupils are equal, round, and reactive to light.   Neck:      Thyroid: No thyromegaly.   Cardiovascular:      Rate and Rhythm: Normal rate and regular rhythm.      Heart sounds: Normal heart sounds. No murmur heard.    No friction rub. No gallop.   Pulmonary:      Effort: Pulmonary effort is normal. No respiratory distress.      Breath sounds: Normal breath sounds. No wheezing.   Abdominal:      General: Bowel sounds are normal. There is no distension.      Palpations: Abdomen is soft.      Tenderness: There is no abdominal tenderness.   Musculoskeletal:         General: No tenderness or deformity. Normal range of motion.      Cervical back: Normal range of motion and neck supple.   Lymphadenopathy:      Cervical: No cervical adenopathy.   Skin:     General: Skin is warm and dry.      Findings: No erythema or rash.   Neurological:      Mental Status: He is alert and oriented to person, place, and time.   Psychiatric:         Mood and Affect: Mood normal.          Thought Content: Thought content normal.         Judgment: Judgment normal.       Assessment:       1. Gilbert's syndrome    2. Colitis    3. Need for hepatitis C screening test        Plan:       Gilbert's syndrome  -     Comprehensive Metabolic Panel; Future; Expected date: 05/25/2023    Colitis  Comments:  currently no smptoms continue to monitor no tx f/u with GI  Orders:  -     CBC Auto Differential; Future; Expected date: 05/25/2023  -     Sedimentation rate; Future; Expected date: 05/25/2023  -     C-reactive protein; Future; Expected date: 05/25/2023    Need for hepatitis C screening test  -     HIV 1/2 Ag/Ab (4th Gen); Future; Expected date: 05/25/2023

## 2023-07-30 ENCOUNTER — PATIENT MESSAGE (OUTPATIENT)
Dept: PRIMARY CARE CLINIC | Facility: CLINIC | Age: 23
End: 2023-07-30
Payer: COMMERCIAL

## 2023-07-30 DIAGNOSIS — K52.9 COLITIS: Primary | ICD-10-CM

## 2023-08-01 NOTE — TELEPHONE ENCOUNTER
I loook ta labs done in oct last yr by NAVI Elizondo they look very good and I ordered labs in may 2023 not done yet. There are no test that will show everything

## 2023-08-07 ENCOUNTER — PATIENT MESSAGE (OUTPATIENT)
Dept: PRIMARY CARE CLINIC | Facility: CLINIC | Age: 23
End: 2023-08-07
Payer: COMMERCIAL

## 2023-08-07 DIAGNOSIS — R07.9 CHEST PAIN, UNSPECIFIED TYPE: Primary | ICD-10-CM

## 2023-08-08 ENCOUNTER — OFFICE VISIT (OUTPATIENT)
Dept: CARDIOLOGY | Facility: CLINIC | Age: 23
End: 2023-08-08
Payer: COMMERCIAL

## 2023-08-08 VITALS
HEIGHT: 67 IN | BODY MASS INDEX: 23.79 KG/M2 | HEART RATE: 66 BPM | SYSTOLIC BLOOD PRESSURE: 125 MMHG | OXYGEN SATURATION: 98 % | WEIGHT: 151.56 LBS | DIASTOLIC BLOOD PRESSURE: 70 MMHG

## 2023-08-08 DIAGNOSIS — R07.2 PRECORDIAL PAIN: Primary | ICD-10-CM

## 2023-08-08 PROCEDURE — 3008F BODY MASS INDEX DOCD: CPT | Mod: CPTII,S$GLB,, | Performed by: NURSE PRACTITIONER

## 2023-08-08 PROCEDURE — 3008F PR BODY MASS INDEX (BMI) DOCUMENTED: ICD-10-PCS | Mod: CPTII,S$GLB,, | Performed by: NURSE PRACTITIONER

## 2023-08-08 PROCEDURE — 3074F PR MOST RECENT SYSTOLIC BLOOD PRESSURE < 130 MM HG: ICD-10-PCS | Mod: CPTII,S$GLB,, | Performed by: NURSE PRACTITIONER

## 2023-08-08 PROCEDURE — 1159F MED LIST DOCD IN RCRD: CPT | Mod: CPTII,S$GLB,, | Performed by: NURSE PRACTITIONER

## 2023-08-08 PROCEDURE — 99202 OFFICE O/P NEW SF 15 MIN: CPT | Mod: S$GLB,,, | Performed by: NURSE PRACTITIONER

## 2023-08-08 PROCEDURE — 93000 EKG 12-LEAD: ICD-10-PCS | Mod: S$GLB,,, | Performed by: INTERNAL MEDICINE

## 2023-08-08 PROCEDURE — 93000 ELECTROCARDIOGRAM COMPLETE: CPT | Mod: S$GLB,,, | Performed by: INTERNAL MEDICINE

## 2023-08-08 PROCEDURE — 99999 PR PBB SHADOW E&M-EST. PATIENT-LVL III: CPT | Mod: PBBFAC,,, | Performed by: NURSE PRACTITIONER

## 2023-08-08 PROCEDURE — 99999 PR PBB SHADOW E&M-EST. PATIENT-LVL III: ICD-10-PCS | Mod: PBBFAC,,, | Performed by: NURSE PRACTITIONER

## 2023-08-08 PROCEDURE — 99202 PR OFFICE/OUTPT VISIT, NEW, LEVL II, 15-29 MIN: ICD-10-PCS | Mod: S$GLB,,, | Performed by: NURSE PRACTITIONER

## 2023-08-08 PROCEDURE — 3078F DIAST BP <80 MM HG: CPT | Mod: CPTII,S$GLB,, | Performed by: NURSE PRACTITIONER

## 2023-08-08 PROCEDURE — 3078F PR MOST RECENT DIASTOLIC BLOOD PRESSURE < 80 MM HG: ICD-10-PCS | Mod: CPTII,S$GLB,, | Performed by: NURSE PRACTITIONER

## 2023-08-08 PROCEDURE — 1160F RVW MEDS BY RX/DR IN RCRD: CPT | Mod: CPTII,S$GLB,, | Performed by: NURSE PRACTITIONER

## 2023-08-08 PROCEDURE — 1160F PR REVIEW ALL MEDS BY PRESCRIBER/CLIN PHARMACIST DOCUMENTED: ICD-10-PCS | Mod: CPTII,S$GLB,, | Performed by: NURSE PRACTITIONER

## 2023-08-08 PROCEDURE — 1159F PR MEDICATION LIST DOCUMENTED IN MEDICAL RECORD: ICD-10-PCS | Mod: CPTII,S$GLB,, | Performed by: NURSE PRACTITIONER

## 2023-08-08 PROCEDURE — 3074F SYST BP LT 130 MM HG: CPT | Mod: CPTII,S$GLB,, | Performed by: NURSE PRACTITIONER

## 2023-08-08 NOTE — PROGRESS NOTES
Cardiology    8/8/2023  9:33 AM    Problem list  Patient Active Problem List   Diagnosis    Gilbert's syndrome       CC:  Chest pain    HPI:  Right sided chest pain that is worse with picking things up.  Mother reports that it may have been on going for longer, patient reports he was getting up of the floor last Sunday and may have been pushing up with right hand.  He has had p[ain like this in the past- This pain is worse this time comparatively.  Pain is throbbing and was worsened with walking. No N/V associated. Mother helps with HPI/ROS- she reports that a week before he was nauseated.  No personal cardiac history but was followed by a pediatric cardiologist at [facility].  May have been in 2018.  Was having the same symptoms at that time.  It will come in intervals, an hour of so later it will disappear and then reappear.  Staying still makes it better, overexerting himself, lifting or bending over can worsen.  No tobacco, no ETOH, no caffeine. No family cardiac disease. Paternal grandmother had a heart condition that involved a valve.     Medications  No current outpatient medications on file.     No current facility-administered medications for this visit.      Prior to Admission medications    Not on File         History  Past Medical History:   Diagnosis Date    Crohn disease      Past Surgical History:   Procedure Laterality Date    COLONOSCOPY N/A 8/31/2022    Procedure: COLONOSCOPY;  Surgeon: Rosi Echevarria MD;  Location: [facility];  Service: Endoscopy;  Laterality: N/A;  REFENDO placed per Dr Echevarria. case request entered-pt scheduled.       will need rapid COVID test-arrival time 9:15-GT  [facility] portal     Social History     Socioeconomic History    Marital status: Single   Tobacco Use    Smoking status: Never     Passive exposure: Never    Smokeless tobacco: Never   Substance and Sexual Activity    Alcohol use: Never    Drug use: Never    Sexual activity: Not Currently         Allergies  Review  of patient's allergies indicates:   Allergen Reactions    Amoxicillin Hives         Review of Systems   Review of Systems   Constitutional: Negative for diaphoresis and malaise/fatigue.   HENT: Negative.     Cardiovascular:  Positive for chest pain. Negative for claudication, dyspnea on exertion, irregular heartbeat, leg swelling, near-syncope, orthopnea, palpitations, paroxysmal nocturnal dyspnea and syncope.   Respiratory:  Negative for shortness of breath.    Endocrine: Negative for polydipsia, polyphagia and polyuria.   Hematologic/Lymphatic: Does not bruise/bleed easily.   Gastrointestinal:  Positive for nausea. Negative for bloating and vomiting.   Genitourinary: Negative.    Neurological:  Negative for excessive daytime sleepiness, dizziness, light-headedness, loss of balance and weakness.   Psychiatric/Behavioral:  The patient is not nervous/anxious.    Allergic/Immunologic: Negative.          Physical Exam  Wt Readings from Last 1 Encounters:   08/08/23 68.8 kg (151 lb 9.1 oz)     BP Readings from Last 3 Encounters:   08/08/23 125/70   05/25/23 110/66   10/13/22 125/82     Pulse Readings from Last 1 Encounters:   08/08/23 66     Body mass index is 23.74 kg/m².    Physical Exam  Vitals and nursing note reviewed.   Constitutional:       Appearance: Normal appearance.   HENT:      Head: Normocephalic and atraumatic.      Mouth/Throat:      Mouth: Mucous membranes are moist.   Eyes:      Pupils: Pupils are equal, round, and reactive to light.   Cardiovascular:      Rate and Rhythm: Normal rate and regular rhythm.      Pulses:           Radial pulses are 2+ on the right side and 2+ on the left side.        Dorsalis pedis pulses are 2+ on the right side and 2+ on the left side.        Posterior tibial pulses are 2+ on the right side and 2+ on the left side.      Heart sounds: No murmur heard.  Pulmonary:      Effort: Pulmonary effort is normal. No respiratory distress.      Breath sounds: Normal breath sounds.    Abdominal:      General: There is no distension.      Tenderness: There is no abdominal tenderness.   Musculoskeletal:      Cervical back: Normal range of motion.      Right lower leg: No edema.      Left lower leg: No edema.   Skin:     General: Skin is warm and dry.      Findings: No erythema.   Neurological:      General: No focal deficit present.      Mental Status: He is alert.   Psychiatric:         Mood and Affect: Mood normal.         Behavior: Behavior normal.       Problem List Items Addressed This Visit          Cardiac/Vascular    Precordial pain - Primary    Overview     EKG suggestive of possible early repolarization.  Has labs pending that would help look for inflammatory markers.    Recommend stress test as patient is complaining of CP and has the nonspecific changes  Await labs and test         Current Assessment & Plan     As above         Relevant Orders    IN OFFICE EKG 12-LEAD (to Muse)    Exercise Stress - EKG                 Follow Up        @Jaelyn Mathews DNP

## 2023-08-17 ENCOUNTER — PATIENT MESSAGE (OUTPATIENT)
Dept: PRIMARY CARE CLINIC | Facility: CLINIC | Age: 23
End: 2023-08-17
Payer: COMMERCIAL

## 2023-08-17 ENCOUNTER — OFFICE VISIT (OUTPATIENT)
Dept: URGENT CARE | Facility: CLINIC | Age: 23
End: 2023-08-17
Payer: COMMERCIAL

## 2023-08-17 VITALS
HEART RATE: 78 BPM | SYSTOLIC BLOOD PRESSURE: 125 MMHG | RESPIRATION RATE: 16 BRPM | DIASTOLIC BLOOD PRESSURE: 78 MMHG | TEMPERATURE: 98 F | BODY MASS INDEX: 23.81 KG/M2 | OXYGEN SATURATION: 95 % | WEIGHT: 151.69 LBS | HEIGHT: 67 IN

## 2023-08-17 DIAGNOSIS — M79.641 RIGHT HAND PAIN: Primary | ICD-10-CM

## 2023-08-17 DIAGNOSIS — M65.4 DE QUERVAIN'S TENOSYNOVITIS, RIGHT: ICD-10-CM

## 2023-08-17 PROCEDURE — 99213 PR OFFICE/OUTPT VISIT, EST, LEVL III, 20-29 MIN: ICD-10-PCS | Mod: 25,S$GLB,, | Performed by: FAMILY MEDICINE

## 2023-08-17 PROCEDURE — 96372 THER/PROPH/DIAG INJ SC/IM: CPT | Mod: S$GLB,,, | Performed by: FAMILY MEDICINE

## 2023-08-17 PROCEDURE — 73130 XR HAND COMPLETE 3 VIEW RIGHT: ICD-10-PCS | Mod: RT,S$GLB,, | Performed by: RADIOLOGY

## 2023-08-17 PROCEDURE — 73130 X-RAY EXAM OF HAND: CPT | Mod: RT,S$GLB,, | Performed by: RADIOLOGY

## 2023-08-17 PROCEDURE — 99213 OFFICE O/P EST LOW 20 MIN: CPT | Mod: 25,S$GLB,, | Performed by: FAMILY MEDICINE

## 2023-08-17 PROCEDURE — 96372 PR INJECTION,THERAP/PROPH/DIAG2ST, IM OR SUBCUT: ICD-10-PCS | Mod: S$GLB,,, | Performed by: FAMILY MEDICINE

## 2023-08-17 RX ORDER — KETOROLAC TROMETHAMINE 30 MG/ML
30 INJECTION, SOLUTION INTRAMUSCULAR; INTRAVENOUS
Status: COMPLETED | OUTPATIENT
Start: 2023-08-17 | End: 2023-08-17

## 2023-08-17 RX ADMIN — KETOROLAC TROMETHAMINE 30 MG: 30 INJECTION, SOLUTION INTRAMUSCULAR; INTRAVENOUS at 11:08

## 2023-08-17 NOTE — PROGRESS NOTES
"Subjective:      Patient ID: Erasmo Regalado is a 23 y.o. male.    Vitals:  height is 5' 7" (1.702 m) and weight is 68.8 kg (151 lb 10.8 oz). His temperature is 98.4 °F (36.9 °C). His blood pressure is 125/78 and his pulse is 78. His respiration is 16 and oxygen saturation is 95%.     Chief Complaint: Hand Pain    Hand Pain   The incident occurred 5 to 7 days ago (3 days). The incident occurred at home. There was no injury mechanism. The pain is present in the right wrist and right hand. The quality of the pain is described as aching. The pain does not radiate. The pain is at a severity of 8/10. The pain is severe. The pain has been Fluctuating since the incident. Associated symptoms include tingling. Pertinent negatives include no muscle weakness or numbness. The symptoms are aggravated by movement. He has tried NSAIDs for the symptoms. The treatment provided no relief.       Neurological:  Negative for numbness.      Objective:     Vitals:    08/17/23 1009   BP: 125/78   BP Location: Left arm   Patient Position: Sitting   BP Method: Large (Automatic)   Pulse: 78   Resp: 16   Temp: 98.4 °F (36.9 °C)   SpO2: 95%   Weight: 68.8 kg (151 lb 10.8 oz)   Height: 5' 7" (1.702 m)      Physical Exam   Constitutional: He is oriented to person, place, and time.   Musculoskeletal: Normal range of motion.         General: Tenderness (right hand) present. No swelling, deformity or signs of injury. Normal range of motion.   Neurological: He is alert and oriented to person, place, and time. No sensory deficit.   Psychiatric: His behavior is normal. Thought content normal.     XR HAND COMPLETE 3 VIEW RIGHT    Result Date: 8/17/2023  EXAMINATION: XR HAND COMPLETE 3 VIEW RIGHT CLINICAL HISTORY: Pain in right hand TECHNIQUE: PA, lateral, and oblique views of the right hand were performed. COMPARISON: None. FINDINGS: Osseous mineralization is preserved.  No acute displaced fractures.  No suspicious lytic or blastic lesions.  Cartilage " spaces are preserved.  No subluxation or dislocation.  Cloudlike soft tissue calcifications project over the volar aspect of the distal pole of the scaphoid.  No significant soft tissue swelling.     Cloudlike soft tissue calcifications adjacent to the volar aspect of the distal scaphoid, possibly sequela of calcium hydroxyapatite deposition. Electronically signed by: Khalif Joshi MD Date:    08/17/2023 Time:    10:54  Assessment:     1. Right hand pain    2. De Quervain's tenosynovitis, right suspected        Plan:       Right hand pain  -     XR HAND COMPLETE 3 VIEW RIGHT; Future; Expected date: 08/17/2023  -     ketorolac injection 30 mg  -     Ambulatory referral/consult to Orthopedics    2. De Quervain's tenosynovitis, right suspected  -     ketorolac injection 30 mg  -     WRIST BRACE FOR HOME USE  -     Ambulatory referral/consult to Orthopedics    Patient Instructions   I have placed a referral to Orthopedic team  Call to schedule an appointment: [phone]

## 2023-08-18 ENCOUNTER — TELEPHONE (OUTPATIENT)
Dept: ORTHOPEDICS | Facility: CLINIC | Age: 23
End: 2023-08-18

## 2023-08-18 ENCOUNTER — OFFICE VISIT (OUTPATIENT)
Dept: ORTHOPEDICS | Facility: CLINIC | Age: 23
End: 2023-08-18
Payer: COMMERCIAL

## 2023-08-18 VITALS — BODY MASS INDEX: 23.81 KG/M2 | HEIGHT: 67 IN | WEIGHT: 151.69 LBS

## 2023-08-18 DIAGNOSIS — S69.91XA INJURY OF RIGHT WRIST, INITIAL ENCOUNTER: Primary | ICD-10-CM

## 2023-08-18 PROCEDURE — 29125 APPL SHORT ARM SPLINT STATIC: CPT | Mod: RT,S$GLB,, | Performed by: PHYSICIAN ASSISTANT

## 2023-08-18 PROCEDURE — 1159F PR MEDICATION LIST DOCUMENTED IN MEDICAL RECORD: ICD-10-PCS | Mod: CPTII,S$GLB,, | Performed by: PHYSICIAN ASSISTANT

## 2023-08-18 PROCEDURE — 1159F MED LIST DOCD IN RCRD: CPT | Mod: CPTII,S$GLB,, | Performed by: PHYSICIAN ASSISTANT

## 2023-08-18 PROCEDURE — 99204 OFFICE O/P NEW MOD 45 MIN: CPT | Mod: 25,S$GLB,, | Performed by: PHYSICIAN ASSISTANT

## 2023-08-18 PROCEDURE — 3008F BODY MASS INDEX DOCD: CPT | Mod: CPTII,S$GLB,, | Performed by: PHYSICIAN ASSISTANT

## 2023-08-18 PROCEDURE — 3008F PR BODY MASS INDEX (BMI) DOCUMENTED: ICD-10-PCS | Mod: CPTII,S$GLB,, | Performed by: PHYSICIAN ASSISTANT

## 2023-08-18 PROCEDURE — 29125 PR APPLY FOREARM SPLINT,STATIC: ICD-10-PCS | Mod: RT,S$GLB,, | Performed by: PHYSICIAN ASSISTANT

## 2023-08-18 PROCEDURE — 99204 PR OFFICE/OUTPT VISIT, NEW, LEVL IV, 45-59 MIN: ICD-10-PCS | Mod: 25,S$GLB,, | Performed by: PHYSICIAN ASSISTANT

## 2023-08-18 PROCEDURE — 99999 PR PBB SHADOW E&M-EST. PATIENT-LVL III: CPT | Mod: PBBFAC,,, | Performed by: PHYSICIAN ASSISTANT

## 2023-08-18 PROCEDURE — 99999 PR PBB SHADOW E&M-EST. PATIENT-LVL III: ICD-10-PCS | Mod: PBBFAC,,, | Performed by: PHYSICIAN ASSISTANT

## 2023-08-18 NOTE — TELEPHONE ENCOUNTER
Look like pt already treated by urgent care xrat hnad calcium deposit in the tendon at base rt thumb if pt not better by next week we can make apppt to see me virtual is ok

## 2023-08-18 NOTE — TELEPHONE ENCOUNTER
LVM c pt. To inform of MRI and f/u appt location & time c Dr. Lopez 08/31/23. Requested a call back to the [facility] at [phone] with any questions, concerns or need for a different appointment time.

## 2023-08-18 NOTE — PROGRESS NOTES
"Subjective:      Patient ID: Erasmo Regalado is a 23 y.o. male.    Chief Complaint: Injury and Pain of the Right Hand      HPI  Erasmo Regalado is a right hand dominant 23 y.o. male presenting today with his mother for evaluation of the right wrist. He reports chronic intermittent pain in the right wrist over the years. He has previously been seen by Dr. Alberto for this and reports completing therapy at the time with some improvement. Today he reports notable pain in the right wrist. He presents to clinic in a sling and ice pack. A few weeks ago he was throwing something and noted pain in the wrist which resulted in mild wrist pain over the past few weeks. More recently he was moving an awning and noted increased pain following this. He reports increased pain over the past two days. Pain is at the dorsal wrist and at the thenar eminence and is increased with use. Denies hx of gout or other joint pains. He works at [employer].        Review of patient's allergies indicates:   Allergen Reactions    Amoxicillin Hives         No current outpatient medications on file.     No current facility-administered medications for this visit.       Past Medical History:   Diagnosis Date    Crohn disease        Past Surgical History:   Procedure Laterality Date    COLONOSCOPY N/A 8/31/2022    Procedure: COLONOSCOPY;  Surgeon: Rosi Echevarria MD;  Location: [facility];  Service: Endoscopy;  Laterality: N/A;  REFENDO placed per Dr Echevarria. case request entered-pt scheduled.       will need rapid COVID test-arrival time 9:15-GT  [facility] portal       Review of Systems:  Constitutional: Negative for chills and fever.   Respiratory: Negative for cough and shortness of breath.    Gastrointestinal: Negative for nausea and vomiting.   Skin: Negative for rash.   Neurological: Negative for dizziness and headaches.   Psychiatric/Behavioral: Negative for depression.   MSK as in HPI       OBJECTIVE:     PHYSICAL EXAM:  Ht 5' 7" (1.702 m)   Wt 68.8 kg (151 " lb 10.8 oz)   BMI 23.76 kg/m²     GEN:  NAD, well-developed, well-groomed.  NEURO: Awake, alert, and oriented. Normal attention and concentration.    PSYCH: Normal mood and affect. Behavior is normal.  HEENT: No cervical lymphadenopathy noted.  CARDIOVASCULAR: Radial pulses 2+ bilaterally. No LE edema noted.  PULMONARY: Breath sounds normal. No respiratory distress.  SKIN: Intact, no rashes.      MSK:   RUE:  Mild edema of the wrist and hand. He has good motion of digits 2-5. He has limited full wrist motion and thumb extension due to pain. He is ttp at the dorsal radiocarpal joint, at the snuffbox, and on the volar thenar eminence. There are no signs of infection. No open wounds or skin abrasions. AIN/PIN/Radial/Median/Ulnar Nerves assessed in isolation without deficit. Radial & Ulnar arteries palpated 2+. Capillary Refill <3s.    RADIOGRAPHS:  Right hand 8/17/23   Impression:   Cloudlike soft tissue calcifications adjacent to the volar aspect of the distal scaphoid, possibly sequela of calcium hydroxyapatite deposition.  Comments: I have personally reviewed the imaging and I agree with the above radiologist's report.    ASSESSMENT/PLAN:       ICD-10-CM ICD-9-CM   1. Injury of right wrist, initial encounter  S69.91XA 959.3       Orders Placed This Encounter    MRI Wrist Joint Without Contrast Right     Plan:   Treatment options discussed. We will obtain MRI of the right thumb. Xray with calcifications, no fracture.  Right thumb spica orthoglass fiberglass splint applied   RTC following MRI       The patient indicates understanding of these issues and agrees to the plan.    Tiana Dorado PA-C  Hand Clinic   [facility]  [place], [place]

## 2023-08-21 ENCOUNTER — PATIENT MESSAGE (OUTPATIENT)
Dept: PRIMARY CARE CLINIC | Facility: CLINIC | Age: 23
End: 2023-08-21
Payer: COMMERCIAL

## 2023-08-22 ENCOUNTER — HOSPITAL ENCOUNTER (OUTPATIENT)
Dept: RADIOLOGY | Facility: HOSPITAL | Age: 23
Discharge: HOME | End: 2023-08-22
Attending: PHYSICIAN ASSISTANT
Payer: COMMERCIAL

## 2023-08-22 DIAGNOSIS — S69.91XA INJURY OF RIGHT WRIST, INITIAL ENCOUNTER: ICD-10-CM

## 2023-08-22 PROCEDURE — 73221 MRI JOINT UPR EXTREM W/O DYE: CPT | Mod: 26,RT,, | Performed by: RADIOLOGY

## 2023-08-22 PROCEDURE — 73221 MRI WRIST WITHOUT CONTRAST RIGHT: ICD-10-PCS | Mod: 26,RT,, | Performed by: RADIOLOGY

## 2023-08-22 PROCEDURE — 73221 MRI JOINT UPR EXTREM W/O DYE: CPT | Mod: TC,RT

## 2023-08-23 ENCOUNTER — PATIENT MESSAGE (OUTPATIENT)
Dept: ORTHOPEDICS | Facility: CLINIC | Age: 23
End: 2023-08-23
Payer: COMMERCIAL

## 2023-08-25 ENCOUNTER — PATIENT MESSAGE (OUTPATIENT)
Dept: ORTHOPEDICS | Facility: CLINIC | Age: 23
End: 2023-08-25
Payer: COMMERCIAL

## 2023-08-31 ENCOUNTER — PATIENT MESSAGE (OUTPATIENT)
Dept: ORTHOPEDICS | Facility: CLINIC | Age: 23
End: 2023-08-31

## 2023-08-31 ENCOUNTER — OFFICE VISIT (OUTPATIENT)
Dept: ORTHOPEDICS | Facility: CLINIC | Age: 23
End: 2023-08-31
Payer: COMMERCIAL

## 2023-08-31 VITALS — HEIGHT: 67 IN | WEIGHT: 151.69 LBS | BODY MASS INDEX: 23.81 KG/M2

## 2023-08-31 DIAGNOSIS — M65.9 TENOSYNOVITIS: Primary | ICD-10-CM

## 2023-08-31 PROBLEM — M65.90 SYNOVITIS: Status: ACTIVE | Noted: 2023-08-31

## 2023-08-31 PROCEDURE — 3008F PR BODY MASS INDEX (BMI) DOCUMENTED: ICD-10-PCS | Mod: CPTII,S$GLB,, | Performed by: ORTHOPAEDIC SURGERY

## 2023-08-31 PROCEDURE — 99214 OFFICE O/P EST MOD 30 MIN: CPT | Mod: S$GLB,,, | Performed by: ORTHOPAEDIC SURGERY

## 2023-08-31 PROCEDURE — 99999 PR PBB SHADOW E&M-EST. PATIENT-LVL III: ICD-10-PCS | Mod: PBBFAC,,, | Performed by: ORTHOPAEDIC SURGERY

## 2023-08-31 PROCEDURE — 99999 PR PBB SHADOW E&M-EST. PATIENT-LVL III: CPT | Mod: PBBFAC,,, | Performed by: ORTHOPAEDIC SURGERY

## 2023-08-31 PROCEDURE — 3008F BODY MASS INDEX DOCD: CPT | Mod: CPTII,S$GLB,, | Performed by: ORTHOPAEDIC SURGERY

## 2023-08-31 PROCEDURE — 99214 PR OFFICE/OUTPT VISIT, EST, LEVL IV, 30-39 MIN: ICD-10-PCS | Mod: S$GLB,,, | Performed by: ORTHOPAEDIC SURGERY

## 2023-08-31 PROCEDURE — 1159F MED LIST DOCD IN RCRD: CPT | Mod: CPTII,S$GLB,, | Performed by: ORTHOPAEDIC SURGERY

## 2023-08-31 PROCEDURE — 1159F PR MEDICATION LIST DOCUMENTED IN MEDICAL RECORD: ICD-10-PCS | Mod: CPTII,S$GLB,, | Performed by: ORTHOPAEDIC SURGERY

## 2023-08-31 NOTE — PROGRESS NOTES
Subjective:      Patient ID: Erasmo Regalado is a 23 y.o. male.    Chief Complaint: Pain and Injury of the Right Wrist      HPI  Erasmo Regalado is a right hand dominant 23 y.o. male presenting today with his mother for evaluation of the right wrist. He reports chronic intermittent pain in the right wrist over the years. He has previously been seen by Dr. Alberto for this and reports completing therapy at the time with some improvement. Today he reports notable pain in the right wrist. He presents to clinic in a sling and ice pack. A few weeks ago he was throwing something and noted pain in the wrist which resulted in mild wrist pain over the past few weeks. More recently he was moving an awning and noted increased pain following this. He reports increased pain over the past two days. Pain is at the dorsal wrist and at the thenar eminence and is increased with use. Denies hx of gout or other joint pains. He works at [employer].    Interval History 8/31/23:  Patient presents today for repeat evaluation. Reports decreased edema and pain of the right wrist and hand with improved ROM. Has not taken any pain medication. Would like to return to work if possible.         Review of patient's allergies indicates:   Allergen Reactions    Amoxicillin Hives         No current outpatient medications on file.     No current facility-administered medications for this visit.       Past Medical History:   Diagnosis Date    Crohn disease        Past Surgical History:   Procedure Laterality Date    COLONOSCOPY N/A 8/31/2022    Procedure: COLONOSCOPY;  Surgeon: Rosi Echevarria MD;  Location: [facility];  Service: Endoscopy;  Laterality: N/A;  REFENDO placed per Dr Echevarria. case request entered-pt scheduled.       will need rapid COVID test-arrival time 9:15-GT  [facility] portal       Review of Systems:  Constitutional: Negative for chills and fever.   Respiratory: Negative for cough and shortness of breath.    Gastrointestinal: Negative for nausea and  "vomiting.   Skin: Negative for rash.   Neurological: Negative for dizziness and headaches.   Psychiatric/Behavioral: Negative for depression.   MSK as in HPI       OBJECTIVE:     PHYSICAL EXAM:  Ht 5' 7" (1.702 m)   Wt 68.8 kg (151 lb 10.8 oz)   BMI 23.76 kg/m²     GEN:  NAD, well-developed, well-groomed.  NEURO: Awake, alert, and oriented. Normal attention and concentration.    PSYCH: Normal mood and affect. Behavior is normal.  HEENT: No cervical lymphadenopathy noted.  CARDIOVASCULAR: Radial pulses 2+ bilaterally. No LE edema noted.  PULMONARY: Breath sounds normal. No respiratory distress.  SKIN: Intact, no rashes.      Right Hand/Wrist Examination:    Observation/Inspection:  Swelling  none    Deformity  none  Discoloration  none     Scars   none    Atrophy  none    HAND/WRIST EXAMINATION:  Finkelstein's Test   Neg  WHAT Test    Neg  Snuff box tenderness   Neg  Kim's Test    Neg  Hook of Hamate Tenderness  Neg  CMC grind    Neg  Circumduction test   Neg    Neurovascular Exam:  Digits WWP, brisk CR < 3s throughout  NVI motor/LTS to M/R/U nerves, radial pulse 2+    ROM hand full, mild pain with thumb flexion    ROM wrist full, painless    ROM elbow full, painless      RADIOGRAPHS:  Right hand 8/17/23   Impression:   Cloudlike soft tissue calcifications adjacent to the volar aspect of the distal scaphoid, possibly sequela of calcium hydroxyapatite deposition.  Comments: I have personally reviewed the imaging and I agree with the above radiologist's report.    Results for orders placed or performed during the hospital encounter of 08/22/23 (from the past 2160 hour(s))   MRI Wrist Joint Without Contrast Right    Impression    1. Soft tissue edema along the volar aspect of the triscaphe joint with superimposed low signal intensity foci corresponding with calcifications identified on previous radiograph, findings that are favored to represent sequela of acute calcific periarthritis.  2. Flexor pollicis longus " tendinosis and tenosynovitis.  3. Unfused hook of the hamate.      Electronically signed by: Khalif Joshi MD  Date:    08/23/2023  Time:    08:43         ASSESSMENT/PLAN:       ICD-10-CM ICD-9-CM   1. Tenosynovitis  M65.9 727.00     Right FPL tenosynovitis         Plan:   OK to return to work, WBAT and ROMAT RUE  Follow up PRN

## 2023-09-05 ENCOUNTER — PATIENT MESSAGE (OUTPATIENT)
Dept: ORTHOPEDICS | Facility: CLINIC | Age: 23
End: 2023-09-05
Payer: COMMERCIAL

## 2023-09-05 NOTE — PROGRESS NOTES
I have personally taken the history and examined this patient. I agree with the resident's note as stated above.        Plan:   OK to return to work, WBAT and ROMAT RUE  Follow up PRN

## 2023-09-18 ENCOUNTER — PATIENT MESSAGE (OUTPATIENT)
Dept: PRIMARY CARE CLINIC | Facility: CLINIC | Age: 23
End: 2023-09-18
Payer: COMMERCIAL

## 2023-10-18 ENCOUNTER — PATIENT MESSAGE (OUTPATIENT)
Dept: CARDIOLOGY | Facility: CLINIC | Age: 23
End: 2023-10-18
Payer: COMMERCIAL

## 2023-11-07 ENCOUNTER — TELEPHONE (OUTPATIENT)
Dept: GASTROENTEROLOGY | Facility: CLINIC | Age: 23
End: 2023-11-07
Payer: COMMERCIAL

## 2023-11-07 NOTE — TELEPHONE ENCOUNTER
Called & spoke to pt  - Informed no involvement of care on file to speak w/ pt's mom  - NP process initiated   - All questions answered

## 2023-11-07 NOTE — TELEPHONE ENCOUNTER
----- Message from Yarely Noriega RN sent at 11/6/2023 11:17 AM CST -----  Contact: pt/[phone]    ----- Message -----  From: Lurdes Jang  Sent: 11/3/2023  12:29 PM CST  To: [facility] Staff    Type:  Sooner Apoointment Request    Caller is requesting a sooner appointment.  Caller declined first available appointment listed below.  Caller will not accept being placed on the waitlist and is requesting a message be sent to doctor.  Name of Caller: Pt mother   When is the first available appointment? No  dates given   Symptoms:yearly follow  up   Would the patient rather a call back or a response via [facility]? Call   Best Call Back Number: [phone] p,t  mother   Additional Information:  Mondays  are better  for pt

## 2023-12-20 ENCOUNTER — TELEPHONE (OUTPATIENT)
Dept: GASTROENTEROLOGY | Facility: CLINIC | Age: 23
End: 2023-12-20
Payer: COMMERCIAL

## 2024-02-21 ENCOUNTER — PATIENT MESSAGE (OUTPATIENT)
Dept: PRIMARY CARE CLINIC | Facility: CLINIC | Age: 24
End: 2024-02-21
Payer: COMMERCIAL

## 2024-02-21 ENCOUNTER — TELEPHONE (OUTPATIENT)
Dept: DERMATOLOGY | Facility: CLINIC | Age: 24
End: 2024-02-21
Payer: COMMERCIAL

## 2024-02-21 NOTE — TELEPHONE ENCOUNTER
Pt is scheduled to come in 06/21/24 @1245----- Message from Suzan Alas sent at 2/21/2024  1:02 PM CST -----  Regarding: pt advice  Contact: [phone]  Caller asking for a call from someone on staff. Pls call to discuss.

## 2024-02-22 ENCOUNTER — TELEPHONE (OUTPATIENT)
Dept: PRIMARY CARE CLINIC | Facility: CLINIC | Age: 24
End: 2024-02-22
Payer: COMMERCIAL

## 2024-02-22 NOTE — TELEPHONE ENCOUNTER
----- Message from Jaycee Rey sent at 2/22/2024  8:17 AM CST -----  Contact: [phone]  Pt is requesting this through the portal please advise    Preferred Date Range: 2/22/2024 - 3/1/2024    Preferred Times: Monday Afternoon, Tuesday Afternoon, Wednesday Afternoon, Thursday Afternoon, Friday Afternoon    Reason for visit: Get a referral for dermatology    Comments:  Get referral for dermatologist.

## 2024-04-04 ENCOUNTER — OFFICE VISIT (OUTPATIENT)
Dept: GASTROENTEROLOGY | Facility: CLINIC | Age: 24
End: 2024-04-04
Payer: COMMERCIAL

## 2024-04-04 VITALS
SYSTOLIC BLOOD PRESSURE: 110 MMHG | BODY MASS INDEX: 23.91 KG/M2 | OXYGEN SATURATION: 97 % | HEART RATE: 73 BPM | WEIGHT: 152.69 LBS | DIASTOLIC BLOOD PRESSURE: 72 MMHG

## 2024-04-04 DIAGNOSIS — K50.10 CROHN'S DISEASE OF LARGE INTESTINE WITHOUT COMPLICATION: Primary | ICD-10-CM

## 2024-04-04 PROCEDURE — 3074F SYST BP LT 130 MM HG: CPT | Mod: CPTII,S$GLB,, | Performed by: INTERNAL MEDICINE

## 2024-04-04 PROCEDURE — 3008F BODY MASS INDEX DOCD: CPT | Mod: CPTII,S$GLB,, | Performed by: INTERNAL MEDICINE

## 2024-04-04 PROCEDURE — 99999 PR PBB SHADOW E&M-EST. PATIENT-LVL III: CPT | Mod: PBBFAC,,, | Performed by: INTERNAL MEDICINE

## 2024-04-04 PROCEDURE — 3078F DIAST BP <80 MM HG: CPT | Mod: CPTII,S$GLB,, | Performed by: INTERNAL MEDICINE

## 2024-04-04 PROCEDURE — 1159F MED LIST DOCD IN RCRD: CPT | Mod: CPTII,S$GLB,, | Performed by: INTERNAL MEDICINE

## 2024-04-04 PROCEDURE — 99213 OFFICE O/P EST LOW 20 MIN: CPT | Mod: S$GLB,,, | Performed by: INTERNAL MEDICINE

## 2024-04-04 NOTE — PROGRESS NOTES
"Subjective:       Patient ID: Erasmo Regalado is a 23 y.o. male.    Chief Complaint: No chief complaint on file.    Patient here today to reestablish care with aforementioned complaints.  He was previously seen by Dr. Echevarria in 2022 with complaints of diarrhea with elevated calprotectin.  Colonoscopy was performed and he was found to have patchy inflammation sparing the terminal ileum, cecum, ascending colon, and transverse colon.  He was diagnosed with "mild Crohn's disease" and placed on mesalamine and ultimately referred to Dr. Braswell.  He apparently no-show that appointment and was subsequently lost to follow-up.  Today patient reports    Doing well. Denies current issues with diarrhea. Has 1 formed bm/day. Denies blood in stool. Currently not taking anything for it    Denies fhx IBD, CRC    Denies upper GI complaints of dysphagia/odynophagia, nausea/vomiting, GERD, heartburn, or abdominal pain.  Denies unexplained weight loss.      Review of Systems   Gastrointestinal:  Negative for abdominal distention, abdominal pain, blood in stool and diarrhea.         Patient is accompanied by his mother who corroborates above history.    The following portions of the patient's history were reviewed and updated as appropriate: allergies, current medications, past family history, past medical history, past social history, past surgical history and problem list.    Objective:      Physical Exam  Constitutional:       Appearance: He is well-developed.   HENT:      Head: Normocephalic and atraumatic.   Eyes:      Conjunctiva/sclera: Conjunctivae normal.   Pulmonary:      Effort: Pulmonary effort is normal. No respiratory distress.   Neurological:      Mental Status: He is alert and oriented to person, place, and time.   Psychiatric:         Behavior: Behavior normal.         Thought Content: Thought content normal.         Judgment: Judgment normal.           Pertinent labs and imaging studies reviewed    Assessment:       1. " Crohn's disease of large intestine without complication        Plan:       Seems to have resolved  Return to clinic if symptoms return.  Otherwise repeat colonoscopy for screening at age 46    (Portions of this note were dictated using voice recognition software and may contain dictation related errors in spelling/grammar/syntax not found on text review)

## 2024-08-12 ENCOUNTER — PATIENT MESSAGE (OUTPATIENT)
Dept: PRIMARY CARE CLINIC | Facility: CLINIC | Age: 24
End: 2024-08-12
Payer: MEDICAID

## 2024-12-30 ENCOUNTER — OFFICE VISIT (OUTPATIENT)
Dept: PRIMARY CARE CLINIC | Facility: CLINIC | Age: 24
End: 2024-12-30
Payer: MEDICAID

## 2024-12-30 VITALS
HEART RATE: 69 BPM | WEIGHT: 148.56 LBS | SYSTOLIC BLOOD PRESSURE: 112 MMHG | BODY MASS INDEX: 23.32 KG/M2 | OXYGEN SATURATION: 98 % | DIASTOLIC BLOOD PRESSURE: 70 MMHG | HEIGHT: 67 IN

## 2024-12-30 DIAGNOSIS — Z00.00 ANNUAL PHYSICAL EXAM: Primary | ICD-10-CM

## 2024-12-30 PROCEDURE — 99395 PREV VISIT EST AGE 18-39: CPT | Mod: S$PBB,,,

## 2024-12-30 PROCEDURE — 1160F RVW MEDS BY RX/DR IN RCRD: CPT | Mod: CPTII,,,

## 2024-12-30 PROCEDURE — 99999 PR PBB SHADOW E&M-EST. PATIENT-LVL III: CPT | Mod: PBBFAC,,,

## 2024-12-30 PROCEDURE — 3078F DIAST BP <80 MM HG: CPT | Mod: CPTII,,,

## 2024-12-30 PROCEDURE — 3074F SYST BP LT 130 MM HG: CPT | Mod: CPTII,,,

## 2024-12-30 PROCEDURE — 1159F MED LIST DOCD IN RCRD: CPT | Mod: CPTII,,,

## 2024-12-30 PROCEDURE — 99213 OFFICE O/P EST LOW 20 MIN: CPT | Mod: PBBFAC,PN

## 2024-12-30 PROCEDURE — 3008F BODY MASS INDEX DOCD: CPT | Mod: CPTII,,,

## 2024-12-30 NOTE — PROGRESS NOTES
Assessment:       1. Annual physical exam       Plan:       Annual physical exam  -     CBC Auto Differential; Future; Expected date: 12/30/2024  -     Comprehensive Metabolic Panel; Future; Expected date: 12/30/2024  -     Lipid Panel; Future; Expected date: 01/30/2025  -     Hemoglobin A1C; Future; Expected date: 12/30/2024  -     TSH; Future; Expected date: 12/30/2024    Assessment & Plan    - Conducted annual checkup for 24-year-old male patient  - Assessed overall health status, including GI health  - Evaluated for potential STD testing, which patient declined  - Planned comprehensive labs to assess liver, kidney, and thyroid function  - Will check hemoglobin A1C to screen for diabetes  - Opted to postpone lipid panel due to patient's non-fasting state    FOLLOW-UP:  - Follow up to schedule lipid panel for a future date when fasting.  - Follow up to review labs results.   -discussed plan with Dr. Shultz          Subjective:    Patient ID: Erasmo Regalado is a 24 y.o. male.  Chief Complaint: Follow-up    25 y/o male presents to clinic today for annual physical exam and blood work. No complaints at this time. 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 He denies illicit drug use, alcohol use, smoking. His mother is present with him at today's visit. Overall he feels well. He denies any flare ups with his stomach. Denies N/V, Diarrhea, constipation, chest pain, shortness of breath, palpitations.     Follow-up  Pertinent negatives include no arthralgias, chest pain, headaches, joint swelling, neck pain, vomiting or weakness.        Review of Systems   Constitutional:  Negative for activity change and unexpected weight change.   HENT:  Negative for hearing loss, rhinorrhea and trouble swallowing.    Eyes:  Negative for discharge and visual disturbance.   Respiratory:  Negative for chest tightness and wheezing.    Cardiovascular:  Negative for chest pain and palpitations.   Gastrointestinal:  Negative for blood in stool,  "constipation, diarrhea and vomiting.   Endocrine: Negative for polydipsia and polyuria.   Genitourinary:  Negative for difficulty urinating, hematuria and urgency.   Musculoskeletal:  Negative for arthralgias, joint swelling and neck pain.   Neurological:  Negative for weakness and headaches.   Psychiatric/Behavioral:  Negative for confusion and dysphoric mood.        Objective:      Vitals:    12/30/24 1440   BP: 112/70   BP Location: Right arm   Patient Position: Sitting   Pulse: 69   SpO2: 98%   Weight: 67.4 kg (148 lb 9.4 oz)   Height: 5' 7" (1.702 m)     BP Readings from Last 5 Encounters:   12/30/24 112/70   04/04/24 110/72   08/17/23 125/78   08/08/23 125/70   05/25/23 110/66     Wt Readings from Last 5 Encounters:   12/30/24 67.4 kg (148 lb 9.4 oz)   04/04/24 69.3 kg (152 lb 10.7 oz)   08/31/23 68.8 kg (151 lb 10.8 oz)   08/18/23 68.8 kg (151 lb 10.8 oz)   08/17/23 68.8 kg (151 lb 10.8 oz)     Physical Exam  Constitutional:       Appearance: Normal appearance.   HENT:      Head: Normocephalic and atraumatic.      Right Ear: Tympanic membrane normal.      Left Ear: Tympanic membrane normal.      Nose: Nose normal.      Mouth/Throat:      Mouth: Mucous membranes are moist.      Pharynx: Oropharynx is clear.   Eyes:      Extraocular Movements: Extraocular movements intact.      Pupils: Pupils are equal, round, and reactive to light.   Cardiovascular:      Rate and Rhythm: Normal rate and regular rhythm.      Heart sounds: No murmur heard.     No friction rub. No gallop.   Pulmonary:      Effort: Pulmonary effort is normal.      Breath sounds: Normal breath sounds.   Abdominal:      General: Abdomen is flat. There is no distension.      Palpations: Abdomen is soft.   Musculoskeletal:         General: Normal range of motion.      Cervical back: Normal range of motion.   Skin:     General: Skin is warm and dry.      Capillary Refill: Capillary refill takes less than 2 seconds.   Neurological:      General: No " focal deficit present.      Mental Status: He is alert.   Psychiatric:         Mood and Affect: Mood normal.         Ears: Erythematous ears.         Lab Results   Component Value Date    WBC 7.09 08/14/2023    HGB 17.5 08/14/2023    HCT 52.2 08/14/2023     08/14/2023    CHOL 170 09/15/2022    CHOL 170 09/15/2022    TRIG 67 09/15/2022    TRIG 67 09/15/2022    HDL 50 09/15/2022    HDL 50 09/15/2022    ALT 42 08/14/2023    AST 24 08/14/2023     08/14/2023    K 4.4 08/14/2023     08/14/2023    CREATININE 1.2 08/14/2023    BUN 8 08/14/2023    CO2 24 08/14/2023      This note was generated with the assistance of ambient listening technology. Verbal consent was obtained by the patient and accompanying visitor(s) for the recording of patient appointment to facilitate this note. I attest to having reviewed and edited the generated note for accuracy, though some syntax or spelling errors may persist. Please contact the author of this note for any clarification.    DOMI Guevara, JUSTO-C

## 2025-03-02 ENCOUNTER — OFFICE VISIT (OUTPATIENT)
Dept: URGENT CARE | Facility: CLINIC | Age: 25
End: 2025-03-02
Payer: COMMERCIAL

## 2025-03-02 ENCOUNTER — PATIENT MESSAGE (OUTPATIENT)
Dept: GASTROENTEROLOGY | Facility: CLINIC | Age: 25
End: 2025-03-02
Payer: COMMERCIAL

## 2025-03-02 ENCOUNTER — PATIENT MESSAGE (OUTPATIENT)
Dept: PRIMARY CARE CLINIC | Facility: CLINIC | Age: 25
End: 2025-03-02
Payer: COMMERCIAL

## 2025-03-02 VITALS
RESPIRATION RATE: 17 BRPM | SYSTOLIC BLOOD PRESSURE: 129 MMHG | WEIGHT: 148 LBS | OXYGEN SATURATION: 98 % | HEIGHT: 67 IN | BODY MASS INDEX: 23.23 KG/M2 | HEART RATE: 64 BPM | TEMPERATURE: 98 F | DIASTOLIC BLOOD PRESSURE: 82 MMHG

## 2025-03-02 DIAGNOSIS — S05.02XA ABRASION OF LEFT CORNEA, INITIAL ENCOUNTER: ICD-10-CM

## 2025-03-02 DIAGNOSIS — H57.12 ACUTE LEFT EYE PAIN: Primary | ICD-10-CM

## 2025-03-02 DIAGNOSIS — H57.10 PAIN IN EYE, UNSPECIFIED LATERALITY: Primary | ICD-10-CM

## 2025-03-02 PROBLEM — M25.531 RIGHT WRIST PAIN: Status: ACTIVE | Noted: 2020-05-18

## 2025-03-02 PROBLEM — S39.012A STRAIN OF BACK: Status: ACTIVE | Noted: 2019-11-27

## 2025-03-02 PROCEDURE — 99213 OFFICE O/P EST LOW 20 MIN: CPT | Mod: S$GLB,,, | Performed by: NURSE PRACTITIONER

## 2025-03-02 RX ORDER — POLYMYXIN B SULFATE AND TRIMETHOPRIM 1; 10000 MG/ML; [USP'U]/ML
1 SOLUTION OPHTHALMIC EVERY 4 HOURS
Qty: 10 ML | Refills: 0 | Status: SHIPPED | OUTPATIENT
Start: 2025-03-02 | End: 2025-03-09

## 2025-03-02 RX ORDER — PROPARACAINE HYDROCHLORIDE 5 MG/ML
1 SOLUTION/ DROPS OPHTHALMIC
Status: COMPLETED | OUTPATIENT
Start: 2025-03-02 | End: 2025-03-02

## 2025-03-02 RX ORDER — FLUORESCEIN SODIUM AND BENOXINATE HYDROCHLORIDE 2.6; 4.4 MG/ML; MG/ML
1 SOLUTION/ DROPS OPHTHALMIC
Status: COMPLETED | OUTPATIENT
Start: 2025-03-02 | End: 2025-03-02

## 2025-03-02 RX ADMIN — FLUORESCEIN SODIUM AND BENOXINATE HYDROCHLORIDE 1 DROP: 2.6; 4.4 SOLUTION/ DROPS OPHTHALMIC at 12:03

## 2025-03-02 RX ADMIN — PROPARACAINE HYDROCHLORIDE 1 DROP: 5 SOLUTION/ DROPS OPHTHALMIC at 12:03

## 2025-03-02 NOTE — PROCEDURES
Procedures    Fluorescein examination: one drop Fluorescein-benoxinate to left eye; abrasion noted 6-7 oclock region, one drop proparacaine 0.5% with pain relief

## 2025-03-02 NOTE — PROGRESS NOTES
"Subjective:      Patient ID: Erasmo Regalado is a 24 y.o. male.    Vitals:  height is 5' 7.01" (1.702 m) and weight is 67.1 kg (148 lb). His oral temperature is 97.9 °F (36.6 °C). His blood pressure is 129/82 and his pulse is 64. His respiration is 17 and oxygen saturation is 98%.     Chief Complaint: Eye Pain (Left > Right sided eye pain and corneal injection for the past few hours, no associated headache, no changes to vision.)    23 yo male presenting with left > right pain associated with photophobia and corneal injection for the past few hours. He denies changes in vision, ocular discharge, headaches, periorbital edema, fever, chills or neck pain/stiffness. Patient presents with a past medical history of Crohns Disease, not currently on treatment. Patient recent illnesses, trauma to the eye, or sick contacts. No OTC meds attempted.     24-year-old male presents to clinic mother with complaints of left eye excessive tearing, pain, light sensitivity started around 1 am upon awakening. Reports prior to he was using his computer for an extended period of time.      Eye Pain   The left eye is affected. This is a new problem. The current episode started yesterday. The problem occurs constantly. The problem has been gradually worsening. There was no injury mechanism. The pain is at a severity of 8/10. The pain is moderate. There is No known exposure to pink eye. He Does not wear contacts. Associated symptoms include eye redness. Pertinent negatives include no blurred vision, eye discharge, double vision, fever, foreign body sensation, itching, nausea, photophobia, recent URI or vomiting. He has tried an eye patch (Cold compress) for the symptoms. The treatment provided mild relief.       Constitution: Negative for fever.   Eyes:  Positive for eye pain and eye redness. Negative for eye trauma, foreign body in eye, eye discharge, eye itching, photophobia, vision loss, double vision and blurred vision.        Light " sensitivity   Gastrointestinal:  Negative for nausea and vomiting.      Objective:     Physical Exam   Constitutional: He is oriented to person, place, and time. He appears well-developed.      Comments:Patient wearing an eye mask with excessive amounts of hair particles to eye area noted     HENT:   Head: Normocephalic and atraumatic.   Ears:   Right Ear: External ear normal.   Left Ear: External ear normal.   Nose: Nose normal.   Mouth/Throat: Oropharynx is clear and moist.   Eyes: Conjunctivae, EOM and lids are normal. Pupils are equal, round, and reactive to light. No foreign body present in the left eye.          Comments: Abrasion noted between 6-7 o'clock region   Neck: Trachea normal and phonation normal. Neck supple.   Cardiovascular: Normal rate.   Pulmonary/Chest: Effort normal.   Musculoskeletal: Normal range of motion.         General: Normal range of motion.   Neurological: He is alert and oriented to person, place, and time.   Skin: Skin is warm, dry and intact.   Psychiatric: His speech is normal and behavior is normal. Judgment and thought content normal.   Nursing note and vitals reviewed.      Assessment:     1. Acute left eye pain    2. Abrasion of left cornea, initial encounter        Plan:       Acute left eye pain  -     fluorescein-benoxinate 0.25-0.4% ophthalmic solution 1 drop  -     proparacaine 0.5 % ophthalmic solution 1 drop    Abrasion of left cornea, initial encounter  -     polymyxin B sulf-trimethoprim (POLYTRIM) 10,000 unit- 1 mg/mL Drop; Place 1 drop into the left eye every 4 (four) hours. for 7 days  Dispense: 10 mL; Refill: 0      Procedures    Fluorescein examination: one drop Fluorescein-benoxinate to left eye; abrasion noted 6-7 oclock region, one drop proparacaine 0.5% with pain relief              Patient Instructions   Please return here or go to the Emergency Department for any concerns or worsening of condition.  Please follow up with your primary care doctor or  specialist as needed.    If you  smoke, please stop smoking.

## 2025-03-03 ENCOUNTER — TELEPHONE (OUTPATIENT)
Dept: OPHTHALMOLOGY | Facility: CLINIC | Age: 25
End: 2025-03-03
Payer: COMMERCIAL

## 2025-03-03 NOTE — TELEPHONE ENCOUNTER
"Pt is requesting referral stating ,"Can you schedule an eye appt asap? In urgent care for serious eye pain. "  "

## 2025-03-05 ENCOUNTER — TELEPHONE (OUTPATIENT)
Dept: OPHTHALMOLOGY | Facility: CLINIC | Age: 25
End: 2025-03-05
Payer: COMMERCIAL

## 2025-03-05 ENCOUNTER — PATIENT MESSAGE (OUTPATIENT)
Dept: GASTROENTEROLOGY | Facility: CLINIC | Age: 25
End: 2025-03-05
Payer: COMMERCIAL

## 2025-03-05 ENCOUNTER — OFFICE VISIT (OUTPATIENT)
Dept: OPHTHALMOLOGY | Facility: CLINIC | Age: 25
End: 2025-03-05
Payer: COMMERCIAL

## 2025-03-05 DIAGNOSIS — H57.10 PAIN IN EYE, UNSPECIFIED LATERALITY: ICD-10-CM

## 2025-03-05 DIAGNOSIS — H20.00 ACUTE ANTERIOR UVEITIS OF LEFT EYE: Primary | ICD-10-CM

## 2025-03-05 PROCEDURE — 1159F MED LIST DOCD IN RCRD: CPT | Mod: CPTII,S$GLB,, | Performed by: OPHTHALMOLOGY

## 2025-03-05 PROCEDURE — 99999 PR PBB SHADOW E&M-EST. PATIENT-LVL III: CPT | Mod: PBBFAC,,, | Performed by: OPHTHALMOLOGY

## 2025-03-05 PROCEDURE — 1160F RVW MEDS BY RX/DR IN RCRD: CPT | Mod: CPTII,S$GLB,, | Performed by: OPHTHALMOLOGY

## 2025-03-05 PROCEDURE — 92002 INTRM OPH EXAM NEW PATIENT: CPT | Mod: S$GLB,,, | Performed by: OPHTHALMOLOGY

## 2025-03-05 NOTE — PROGRESS NOTES
"HPI    Here for possible abrasion OS    Eye meds:  polymyxin OS QID    24 year old male states a few days ago he suddenly woke up with eye pain   and sensitivity to light. Denies wearing contacts. Denies flashes,   floaters or diplopia. Pt has been wearing an eye patch to help with   sensitivity. Denies using any OTC drops. Pt went to Urgent Care 3/1/25.   C/o of "burning"  Last edited by Carmen Giron on 3/5/2025  1:45 PM.            Assessment /Plan     For exam results, see Encounter Report.    Pain in eye, unspecified laterality  -     Ambulatory referral/consult to Ophthalmology                         "

## 2025-03-05 NOTE — PROGRESS NOTES
"Subjective:       Patient ID: Erasmo Regalado is a 24 y.o. male.    Chief Complaint: Concerns About Ocular Health    HPI    Here for possible abrasion OS    Eye meds:  polymyxin OS QID    24 year old male states a few days ago he suddenly woke up with eye pain   and sensitivity to light. Denies wearing contacts. Denies flashes,   floaters or diplopia. Pt has been wearing an eye patch to help with   sensitivity. Denies using any OTC drops. Pt went to Urgent Care 3/1/25.   C/o of "burning"  Last edited by Carmen Giron on 3/5/2025  1:45 PM.             Assessment:       1. Acute anterior uveitis of left eye    2. Pain in eye, unspecified laterality        Plan:       Iritis OS-First episode. Most likely related to hx/o Chron's dz.      D/c abx's OS.  Start PF OS q 2 hrs while awake today, then qid.  Excuse from work 3/3-3/7/25. Can return to work on 3/10/25.  RTC 1 wk.        "

## 2025-03-05 NOTE — TELEPHONE ENCOUNTER
Spoke with the mother and explain the difference between opt and oph.. Pt will be seen today at [facility] for 130

## 2025-03-05 NOTE — TELEPHONE ENCOUNTER
----- Message from [employer] [place] sent at 3/5/2025 10:44 AM CST -----  Regarding: Consult/Advisory  Contact: Katt    Consult/Advisory Name Of Caller:Katt hernandes  Contact Preference:[phone]  Nature of call:Patients mother is calling to speak with office before appt today she has questions. Requesting a call back

## 2025-03-05 NOTE — TELEPHONE ENCOUNTER
Clinic left voicemail for patient to offer morning appointment time for [facility] 03/05/2025 urgent care appointment with Dr. Coleman.

## 2025-03-05 NOTE — LETTER
March 5, 2025      [facility]  [address]  [place] [address]  Phone: [phone]       Patient: Erasmo Regalado   YOB: 2000  Date of Visit: 03/05/2025    To Whom It May Concern:    Felicia Regalado  was at [facility] on 03/05/2025. The patient was seen today for an issue with his left eye.  The patient was out of work with this condition since 03/03/2025 and may return to work on 03/10/2025 with no restrictions. If you have any questions or concerns, or if I can be of further assistance, please do not hesitate to contact me.    Sincerely,    Martinez Coleman M.D.

## 2025-03-12 ENCOUNTER — OFFICE VISIT (OUTPATIENT)
Dept: OPHTHALMOLOGY | Facility: CLINIC | Age: 25
End: 2025-03-12
Payer: COMMERCIAL

## 2025-03-12 DIAGNOSIS — H20.00 ACUTE ANTERIOR UVEITIS OF LEFT EYE: Primary | ICD-10-CM

## 2025-03-12 DIAGNOSIS — H52.7 REFRACTIVE ERROR: ICD-10-CM

## 2025-03-12 PROCEDURE — 1159F MED LIST DOCD IN RCRD: CPT | Mod: CPTII,S$GLB,, | Performed by: OPHTHALMOLOGY

## 2025-03-12 PROCEDURE — 99999 PR PBB SHADOW E&M-EST. PATIENT-LVL II: CPT | Mod: PBBFAC,,, | Performed by: OPHTHALMOLOGY

## 2025-03-12 PROCEDURE — 92012 INTRM OPH EXAM EST PATIENT: CPT | Mod: S$GLB,,, | Performed by: OPHTHALMOLOGY

## 2025-03-12 PROCEDURE — 1160F RVW MEDS BY RX/DR IN RCRD: CPT | Mod: CPTII,S$GLB,, | Performed by: OPHTHALMOLOGY

## 2025-03-12 RX ORDER — POLYMYXIN B SULFATE AND TRIMETHOPRIM 1; 10000 MG/ML; [USP'U]/ML
SOLUTION OPHTHALMIC
COMMUNITY
Start: 2025-03-02

## 2025-03-12 NOTE — PROGRESS NOTES
Subjective:       Patient ID: Erasmo Regalado is a 24 y.o. male.    Chief Complaint: Blurred Vision    HPI    Pt sts vision is a little spotty but better then it was before. NO flashes   no floaters  Last edited by Iaz Arce on 3/12/2025  2:05 PM.             Assessment:       1. Acute anterior uveitis of left eye    2. Refractive error        Plan:       Iritis OS-Resolving on PF taper.  RE-Pt wants MRx.      PF bid OU x 3 days, then qd x 3 days.  Give MRx.  RTC me prn.

## 2025-03-20 ENCOUNTER — TELEPHONE (OUTPATIENT)
Dept: GASTROENTEROLOGY | Facility: CLINIC | Age: 25
End: 2025-03-20

## 2025-03-20 ENCOUNTER — PATIENT MESSAGE (OUTPATIENT)
Dept: OPHTHALMOLOGY | Facility: CLINIC | Age: 25
End: 2025-03-20
Payer: COMMERCIAL

## 2025-03-20 ENCOUNTER — PATIENT MESSAGE (OUTPATIENT)
Dept: GASTROENTEROLOGY | Facility: CLINIC | Age: 25
End: 2025-03-20

## 2025-03-20 ENCOUNTER — OFFICE VISIT (OUTPATIENT)
Dept: GASTROENTEROLOGY | Facility: CLINIC | Age: 25
End: 2025-03-20
Payer: COMMERCIAL

## 2025-03-20 DIAGNOSIS — K50.811 CROHN'S DISEASE OF BOTH SMALL AND LARGE INTESTINE WITH RECTAL BLEEDING: Primary | ICD-10-CM

## 2025-03-20 RX ORDER — MESALAMINE 500 MG/1
2000 CAPSULE, EXTENDED RELEASE ORAL 4 TIMES DAILY
Qty: 240 CAPSULE | Refills: 11 | Status: SHIPPED | OUTPATIENT
Start: 2025-03-20

## 2025-03-20 NOTE — PROGRESS NOTES
Subjective:       Patient ID: Erasmo Regalado is a 24 y.o. male.    Chief Complaint: Crohn's    Telemedicine encounter today to follow-up with aforementioned complaints.  He was previously seen by me last April for follow-up of ileocolonic Crohn's.  At that time he was doing well so he was instructed to stay off previously prescribed mesalamine and follow-up if symptoms returned.  Today patient reports    2-3 weeks bleeding.  Feels like he's been flaring. Small amounts of blood. 2-3 bm/day.     Blurry vision.  Saw ophthalmology.  Maybe due to Crohn's disease.  Started on eyedrops      Review of Systems   Gastrointestinal:  Positive for blood in stool and diarrhea.       The following portions of the patient's history were reviewed and updated as appropriate: allergies, current medications, past family history, past medical history, past social history, past surgical history and problem list.    Objective:      Physical Exam  Constitutional:       Appearance: He is well-developed.   HENT:      Head: Normocephalic and atraumatic.   Eyes:      Conjunctiva/sclera: Conjunctivae normal.   Pulmonary:      Effort: Pulmonary effort is normal. No respiratory distress.   Neurological:      Mental Status: He is alert and oriented to person, place, and time.   Psychiatric:         Behavior: Behavior normal.         Thought Content: Thought content normal.         Judgment: Judgment normal.           Pertinent labs and imaging studies reviewed    Assessment:       1. Crohn's disease of both small and large intestine with rectal bleeding        Plan:       Repeat stool studies.  Could consider repeating colonoscopy   Restart Pentasa as seemed to have worked in the past  May need biologic given concurrent uveitis    The patient location is: home  The chief complaint leading to consultation is: CD    Visit type: audiovisual      25 minutes of total time spent on the encounter, which includes face to face time and non-face to face  time preparing to see the patient (eg, review of tests), Obtaining and/or reviewing separately obtained history, Documenting clinical information in the electronic or other health record, Independently interpreting results (not separately reported) and communicating results to the patient/family/caregiver, or Care coordination (not separately reported).         Each patient to whom he or she provides medical services by telemedicine is:  (1) informed of the relationship between the physician and patient and the respective role of any other health care provider with respect to management of the patient; and (2) notified that he or she may decline to receive medical services by telemedicine and may withdraw from such care at any time.    Notes:       (Portions of this note were dictated using voice recognition software and may contain dictation related errors in spelling/grammar/syntax not found on text review)

## 2025-03-25 PROCEDURE — 87425 ROTAVIRUS AG IA: CPT | Performed by: INTERNAL MEDICINE

## 2025-03-25 PROCEDURE — 87449 NOS EACH ORGANISM AG IA: CPT | Performed by: INTERNAL MEDICINE

## 2025-03-25 PROCEDURE — 87328 CRYPTOSPORIDIUM AG IA: CPT | Performed by: INTERNAL MEDICINE

## 2025-03-25 PROCEDURE — 87427 SHIGA-LIKE TOXIN AG IA: CPT | Performed by: INTERNAL MEDICINE

## 2025-03-25 PROCEDURE — 87046 STOOL CULTR AEROBIC BACT EA: CPT | Performed by: INTERNAL MEDICINE

## 2025-04-02 ENCOUNTER — PATIENT MESSAGE (OUTPATIENT)
Dept: GASTROENTEROLOGY | Facility: CLINIC | Age: 25
End: 2025-04-02
Payer: COMMERCIAL

## 2025-04-11 ENCOUNTER — PATIENT MESSAGE (OUTPATIENT)
Dept: PRIMARY CARE CLINIC | Facility: CLINIC | Age: 25
End: 2025-04-11
Payer: COMMERCIAL

## 2025-04-11 ENCOUNTER — PATIENT MESSAGE (OUTPATIENT)
Dept: GASTROENTEROLOGY | Facility: CLINIC | Age: 25
End: 2025-04-11
Payer: COMMERCIAL

## 2025-04-11 DIAGNOSIS — K51.919 ULCERATIVE COLITIS WITH COMPLICATION, UNSPECIFIED LOCATION: Primary | ICD-10-CM

## 2025-04-11 NOTE — LETTER
April 14, 2025       [facility]  [address]  [place] [address]  Phone: [phone]  Fax: [phone]       Patient: Erasmo Regalado   YOB: 2000  Date of Visit: 04/14/2025    To Whom It May Concern:    Felicia Regalado  has been under my care since April 2024.  He was diagnosed with Crohn's disease affecting this colon and August of 2022.  He was doing fairly well subjectively until March of this year when his disease seems to have worsened.  He is back on therapy and we are in the process of getting his disease under better control.   If you have any questions or concerns, or if I can be of further assistance, please do not hesitate to contact me.    Sincerely,    Homero Iniguez MD

## 2025-04-14 ENCOUNTER — PATIENT MESSAGE (OUTPATIENT)
Dept: GASTROENTEROLOGY | Facility: CLINIC | Age: 25
End: 2025-04-14
Payer: COMMERCIAL

## 2025-04-17 ENCOUNTER — TELEPHONE (OUTPATIENT)
Dept: GASTROENTEROLOGY | Facility: CLINIC | Age: 25
End: 2025-04-17
Payer: COMMERCIAL

## 2025-04-17 NOTE — TELEPHONE ENCOUNTER
MA called/LVM for patient to call the office at his convenience in regards to scheduling an appointment with Dr Gupta in her IBD clinic.    Referred Dr Iniguez, IBD consult, Crohn's

## 2025-04-22 ENCOUNTER — PATIENT MESSAGE (OUTPATIENT)
Dept: GASTROENTEROLOGY | Facility: CLINIC | Age: 25
End: 2025-04-22
Payer: COMMERCIAL

## 2025-04-22 ENCOUNTER — TELEPHONE (OUTPATIENT)
Dept: GASTROENTEROLOGY | Facility: CLINIC | Age: 25
End: 2025-04-22
Payer: COMMERCIAL

## 2025-04-22 NOTE — TELEPHONE ENCOUNTER
"Spoke with patient mom. Mom wanted to know what the difference between Dr Iniguez and Dr Gupta is, she ask why Dr Iniguez referred Mr Siu to her. Mom advise that Dr Gupta has a clinic at the [facility] specializes in IBS. Mom also advise that if she had any other question I can send the message to the supervisor, Mom said "No, you answered all my questions". Mom was advise to call back if she had any other question.   "

## 2025-04-22 NOTE — TELEPHONE ENCOUNTER
----- Message from Med Assistant Rodriguez sent at 4/22/2025 11:00 AM CDT -----  Regarding: FW: Advice  Contact: [phone]    ----- Message -----  From: Mel Macedo  Sent: 4/22/2025  10:14 AM CDT  To: [facility] Clinical Support  Subject: Advice                                           Patient mom is calling to speak with a supervisor. Please contact pt

## 2025-04-28 ENCOUNTER — PATIENT MESSAGE (OUTPATIENT)
Dept: GASTROENTEROLOGY | Facility: CLINIC | Age: 25
End: 2025-04-28
Payer: COMMERCIAL

## 2025-04-28 ENCOUNTER — TELEPHONE (OUTPATIENT)
Dept: GASTROENTEROLOGY | Facility: CLINIC | Age: 25
End: 2025-04-28
Payer: COMMERCIAL

## 2025-04-28 NOTE — TELEPHONE ENCOUNTER
Karina spoke with the mother and explained that Dr Iniguez is not going to change anything on his disability form and the he has to see the doctor at the IBD clinic and let them determine if they will fill out the paperwork with the dates that they are asking for. Pt's appt is 5/7/2025

## 2025-05-07 ENCOUNTER — OFFICE VISIT (OUTPATIENT)
Dept: GASTROENTEROLOGY | Facility: CLINIC | Age: 25
End: 2025-05-07
Payer: COMMERCIAL

## 2025-05-07 VITALS
BODY MASS INDEX: 22.23 KG/M2 | HEART RATE: 76 BPM | DIASTOLIC BLOOD PRESSURE: 77 MMHG | HEIGHT: 67 IN | SYSTOLIC BLOOD PRESSURE: 119 MMHG | WEIGHT: 141.63 LBS

## 2025-05-07 DIAGNOSIS — K50.10 CROHN'S DISEASE OF COLON WITHOUT COMPLICATION: ICD-10-CM

## 2025-05-07 PROCEDURE — 99999 PR PBB SHADOW E&M-EST. PATIENT-LVL III: CPT | Mod: PBBFAC,,, | Performed by: STUDENT IN AN ORGANIZED HEALTH CARE EDUCATION/TRAINING PROGRAM

## 2025-05-07 PROCEDURE — 3008F BODY MASS INDEX DOCD: CPT | Mod: CPTII,S$GLB,, | Performed by: STUDENT IN AN ORGANIZED HEALTH CARE EDUCATION/TRAINING PROGRAM

## 2025-05-07 PROCEDURE — 3078F DIAST BP <80 MM HG: CPT | Mod: CPTII,S$GLB,, | Performed by: STUDENT IN AN ORGANIZED HEALTH CARE EDUCATION/TRAINING PROGRAM

## 2025-05-07 PROCEDURE — 99215 OFFICE O/P EST HI 40 MIN: CPT | Mod: S$GLB,,, | Performed by: STUDENT IN AN ORGANIZED HEALTH CARE EDUCATION/TRAINING PROGRAM

## 2025-05-07 PROCEDURE — 3074F SYST BP LT 130 MM HG: CPT | Mod: CPTII,S$GLB,, | Performed by: STUDENT IN AN ORGANIZED HEALTH CARE EDUCATION/TRAINING PROGRAM

## 2025-05-07 NOTE — PROGRESS NOTES
[facility]          Inflammatory Bowel Disease New Patient Consultation Note         TODAY'S VISIT DATE:  5/8/2025    Reason for Consult:    Chief Complaint   Patient presents with    Crohn's Disease    Ulcerative Colitis    Follow-up       PCP: Yousif Shultz      Referring MD:   Dr. Homero Mercedes*    Erasmo Regalado who is a 24 y.o. male is being seen today at the [facility] on 05/08/2025 for inflammatory bowel disease- Crohn's disease.     IBD History:  Initially presented to GI Clinic, saw Dr. Echevarria for bloody diarrhea in 2022.  Stool studies were positive for Cryptosporidium.  Fecal calprotectin was 672.  He was treated with nitazoxanide for 3 days.  He had a colonoscopy 8/31/2022 with showed a normal TI, there was inflammation in the rectum, sigmoid, descending colon as well as the hepatic flexure.  Biopsies from those areas showed moderate chronic colitis with cryptitis and crypt abscesses.  There was no inflammation in the transverse and ascending colon.  CT enterography was negative for small bowel inflammation.  He was started on mesalamine and referred to IBD Clinic.  He missed his appointment there.  Eventually, his symptoms spontaneously improved.  He saw Dr. Iniguez in April 2024 which time his symptoms had resolved.  I am not sure if he was on mesalamine at that time.  In March 2025 he went back to Dr. Iniguez with a three-week history of bloody diarrhea.  He was having 5-6 bowel movements a day, was out of work.  He restarted him on Pentasa.  Fecal calprotectin 503.  Referred to IBD Clinic.    He did not really take the Pentasa for very long, his symptoms improved over the next several weeks, currently has 1 formed bowel movement with no further blood.  No abdominal pain.  He had an episode of uveitis in march 2025 that was treated with eyedrops that has since resolved.  Currently only taking the Pentasa about once a  "week.    History of Present Illness:        IBD Details:  Dx Date: 2022  Disease type/distribution:  Colon:  Rectum to descending, hepatic flexure  Current Treatment:  None  Prior surgeries: no  CRP Elevation: N  Disease Complications: none  Extraintestinal manifestations: uveitis  Prior treatments:   Steroids: no  5ASA:  Oral mesalamine  IMM: no  TNF Inh: no   Anti-Integrin: no   IL 12/23: no  MATT Inh: no    Previous Clinical Trials: no    Last Colonoscopy:   Colonoscopy 8/2022 -  normal TI, there was inflammation in the rectum, sigmoid, descending colon as well as the hepatic flexure.  Biopsies from those areas showed moderate chronic colitis with cryptitis and crypt abscesses.  There was no inflammation in the transverse and ascending colon.     Other Endoscopies: no    Imaging:   MRE: no   CTE 2022: no small bowel involvement   Other: -    Vaccinations:  No results found for: "HEPBSAB"   No results found for: "HEPBSURFABQU"   No results found for: "HEPAIGG"   No results found for: "VARICELLAZOS", "VARICELLAINT"     Immunization History   Administered Date(s) Administered    DTaP 2000, 2000, 02/14/2002, 02/28/2003, 08/15/2005    Hep B / HiB 2000    Hepatitis A, Pediatric/Adolescent, 2 Dose 03/01/2017, 03/01/2017, 11/21/2017, 11/21/2017    Hepatitis B 2000, 05/14/2002    Hib-HbOC 2000, 2000, 02/14/2002, 11/15/2002    IPV 2000, 2001, 02/28/2003, 08/15/2005    MMR 08/20/2002, 08/15/2005    Meningococcal Conjugate (MCV4P) 09/19/2012, 08/14/2017, 08/14/2017    Pneumococcal Conjugate - 7 Valent 2001, 02/14/2002, 05/14/2002, 11/15/2002    Tdap 09/19/2012, 08/16/2019    Varicella 08/20/2002, 09/26/2007      Flu shot: recommended yearly   COVID vaccine/booster:  per CDC recommendations   Tetanus (Tdap):  2019  Pneumonia: not immunosuppressed  HPV:    due  Meningococcal: vaccinated  RSV: not indicated  Hepatitis B:   vaccinated, will check immunity  Hepatitis A:   " "vaccinated 2017  MMR (live vaccine):     unknown, will check immunity  Chickenpox status/Varicella (live vaccine):    Shingrix: not indicated    Pertinent Labs:  Labs: Reviewed     Lab Results   Component Value Date    CRP 0.4 08/14/2023    CALPROTECTIN 503 03/25/2025        Lab Results   Component Value Date    HEPBSAG Non-reactive 10/13/2022      No results found for: "TBGOLDPLUS"   No results found for: "PRBXSGTS34MZ", "FCAKVLIW09"     Lab Results   Component Value Date    WBC 5.31 12/30/2024    HGB 16.5 12/30/2024    HCT 49.4 12/30/2024    MCV 91 12/30/2024     12/30/2024        Lab Results   Component Value Date    CREATININE 1.1 12/30/2024    ALBUMIN 4.4 12/30/2024    BILITOT 1.9 (H) 12/30/2024    ALKPHOS 72 12/30/2024    AST 18 12/30/2024    ALT 24 12/30/2024        Therapeutic Drug Monitoring Labs:  No results found for: "PROMETH"  No results found for: "ANSADAINIT", "INFLIXIMAB", "INFLIXINTERP"      Medical History:   Past Medical History:   Diagnosis Date    Crohn disease        Surgical History:  Past Surgical History:   Procedure Laterality Date    COLONOSCOPY N/A 8/31/2022    Procedure: COLONOSCOPY;  Surgeon: Rosi Echevarria MD;  Location: [facility];  Service: Endoscopy;  Laterality: N/A;  REFENDO placed per Dr Echevarria. case request entered-pt scheduled.       will need rapid COVID test-arrival time 9:15-GT  [facility] portal       Family History:   Family History   Problem Relation Name Age of Onset    No Known Problems Mother      No Known Problems Father      No Known Problems Sister      No Known Problems Sister      No Known Problems Sister      No Known Problems Sister      No Known Problems Brother      Diabetes Maternal Grandmother      Hyperlipidemia Maternal Grandmother      Hypertension Maternal Grandmother      Heart disease Paternal Grandmother      Colon cancer Neg Hx      Esophageal cancer Neg Hx      Cirrhosis Neg Hx         Social History:   Social History[1]    Allergies: " "Reviewed    Home Medications:   Medication List with Changes/Refills   Current Medications    MESALAMINE (LIALDA) 1.2 GRAM TBEC    Take 2 tablets (2.4 g total) by mouth daily with breakfast.    POLYMYXIN B SULF-TRIMETHOPRIM (POLYTRIM) 10,000 UNIT- 1 MG/ML DROP    SMARTSI Drop(s) Left Eye Every 4 Hours       Physical Exam:  Vital Signs:  /77   Pulse 76   Ht 5' 7" (1.702 m)   Wt 64.3 kg (141 lb 10.3 oz)   BMI 22.18 kg/m²   Body mass index is 22.18 kg/m².    Constitutional:  not in acute distress and well developed  HENT: Head: Normal, normocephalic, atraumatic.  Eyes: conjunctiva clear and sclera nonicteric  Musculoskeletal: no muscular tenderness noted  Skin: normal color  Neurological: alert, oriented x3  Psychiatric: mood and affect are within normal limits, pt is a good historian; no memory problems were noted    Labs: reviewed and pertinent noted above    Assessment/Plan:  Erasmo Regalado is a 24 y.o. male with Crohn's disease. The following issues were addresssed:    # Crohn's disease  Initially diagnosed in  after he presented with bloody diarrhea. Distribution: descending, sigmoid and rectum as well as a discontinuous area at the hepatic flexure. No small bowel involvement.  Initially treated with mesalamine for a short time then symptoms resolved. Also had a cryptosporidium infection which was treated.  Had what sounds like another flare 2 months ago, improved with mesalamine but didn't really take in consistently so may have spontaneously resolved.  He is currently asymptomatic.  Will assess disease activity with repeat colonoscopy. If normal, the patient prefers not to start any maintenance therapy and treat flares as they happen. This may be reasonable.  If he decides to proceed with maintenance, a TNF inhibitor might be the best choice given history of uveitis.  - Colonoscopy  - filled short term disability paperwork for the time that he was out of work due to his flare, explain that with " treatment, IBD patient are expected to lead a normal life most of the time  - Will get IBD baseline labs next visit if we decide to proceed with maintenance therapy    # Anterior uveitis  Had an episode in March, around the time he was having the flare  Treated with eyedrops  Would treat with TNF inhibitor if needs biologics in the future    # Gilbert's Disease   Mildly elevated bilirubin, seen by hepatology, diagnosed with UG1T deficiency.    # IBD specific health maintenance:  Colon cancer surveillance: will need colonoscopy every 1-2 years starting 2030    Annual:  - Eye exam: per ophtho  - Skin exam (if on IMM/TNF):   - reminded pt to use sunblock/hats/sunprotective clothing    DEXA: Steroids >3m/Steroids <1y within last 2y/FH osteoporosis/Malnourished/amenorrhea/post-menopausal - not indicated    Vitamin D: will order next time    Follow up: pending colonoscopy results    Thank you again for allowing us to participate in Erasmo Regalado's care.    I spent 45 minutes today on face to face care, counseling, records review, coordination of care and documentation.    Lizzette Gupta MD  Department of Gastroenterology               [1]   Social History  Tobacco Use    Smoking status: Never     Passive exposure: Never    Smokeless tobacco: Never   Substance Use Topics    Alcohol use: Never    Drug use: Never

## 2025-05-08 ENCOUNTER — PATIENT MESSAGE (OUTPATIENT)
Facility: CLINIC | Age: 25
End: 2025-05-08
Payer: COMMERCIAL

## 2025-05-08 ENCOUNTER — PATIENT MESSAGE (OUTPATIENT)
Dept: GASTROENTEROLOGY | Facility: CLINIC | Age: 25
End: 2025-05-08
Payer: COMMERCIAL

## 2025-05-12 ENCOUNTER — TELEPHONE (OUTPATIENT)
Dept: GASTROENTEROLOGY | Facility: CLINIC | Age: 25
End: 2025-05-12
Payer: COMMERCIAL

## 2025-05-12 ENCOUNTER — PATIENT MESSAGE (OUTPATIENT)
Dept: GASTROENTEROLOGY | Facility: CLINIC | Age: 25
End: 2025-05-12
Payer: COMMERCIAL

## 2025-05-12 DIAGNOSIS — Z12.11 SCREENING FOR COLON CANCER: Primary | ICD-10-CM

## 2025-05-12 NOTE — TELEPHONE ENCOUNTER
Patient is scheduled for 5/27/2025 with [facility] for a colonoscopy I sent the rx Golytely to [facility] to approve.

## 2025-05-12 NOTE — TELEPHONE ENCOUNTER
----- Message from Lizzette Gupta MD sent at 5/9/2025  9:33 AM CDT -----  Regarding: reschedule colonoscopy  He had a bad prep, will need to reschedule colonoscopy with golytely. thanks

## 2025-05-14 RX ORDER — POLYETHYLENE GLYCOL 3350, SODIUM SULFATE ANHYDROUS, SODIUM BICARBONATE, SODIUM CHLORIDE, POTASSIUM CHLORIDE 236; 22.74; 6.74; 5.86; 2.97 G/4L; G/4L; G/4L; G/4L; G/4L
4 POWDER, FOR SOLUTION ORAL ONCE
Qty: 4000 ML | Refills: 0 | Status: SHIPPED | OUTPATIENT
Start: 2025-05-14 | End: 2025-05-14

## 2025-05-27 ENCOUNTER — CLINICAL SUPPORT (OUTPATIENT)
Dept: GASTROENTEROLOGY | Facility: CLINIC | Age: 25
End: 2025-05-27
Payer: COMMERCIAL

## 2025-05-27 ENCOUNTER — TELEPHONE (OUTPATIENT)
Dept: GASTROENTEROLOGY | Facility: CLINIC | Age: 25
End: 2025-05-27
Payer: COMMERCIAL

## 2025-05-27 DIAGNOSIS — K50.10 CROHN'S DISEASE OF COLON WITHOUT COMPLICATION: Primary | ICD-10-CM

## 2025-05-27 PROCEDURE — 99999 PR PBB SHADOW E&M-EST. PATIENT-LVL I: CPT | Mod: PBBFAC,,,

## 2025-06-09 ENCOUNTER — TELEPHONE (OUTPATIENT)
Dept: ENDOSCOPY | Facility: HOSPITAL | Age: 25
End: 2025-06-09
Payer: COMMERCIAL

## 2025-06-09 ENCOUNTER — TELEPHONE (OUTPATIENT)
Dept: GASTROENTEROLOGY | Facility: CLINIC | Age: 25
End: 2025-06-09
Payer: COMMERCIAL

## 2025-06-09 NOTE — TELEPHONE ENCOUNTER
Patient called to cancel colonoscopy at [facility], and does not want to reschedule at this time.

## 2025-06-09 NOTE — TELEPHONE ENCOUNTER
Pt cancelled colonoscopy due to insurance problems pt will call us back when he gets his insurance straight.

## 2025-06-11 ENCOUNTER — OUTPATIENT CASE MANAGEMENT (OUTPATIENT)
Dept: ADMINISTRATIVE | Facility: OTHER | Age: 25
End: 2025-06-11
Payer: COMMERCIAL

## 2025-06-11 DIAGNOSIS — E80.4 GILBERT'S SYNDROME: Primary | ICD-10-CM

## 2025-06-11 NOTE — PROGRESS NOTES
Email received: phone message left yesterday on the  voice mail @ 253pm. Caller did not leave her name but said she has been calling for 2 weeks leaving messages needing social work assistance for her son. Her number is [phone].     Spoke to Katt (pt mother) reporting that the pt has been receiving care outpatient, pt dx'd with Crohn's disease and been out of work since March. Katt reports experiencing challenges getting documents completed in a way that is acceptable for his employer to get payment for the time that he has been out of work.     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  Mother reports that patient fears incontinence may occur at work.     Additionally, patient recently lost insurance and is needing a colonoscopy.  Medicaid and [facility] Financial [facility] applications initiated.     Spoke to patient who wishes to have a call back from .  Encouraged both to complete Involvement in Care documentation. Will place order for [facility].   Assured pt that the details would be documented and shared with [facility] SW leadership.  Provided contact information in case challenges arise.

## 2025-06-11 NOTE — PROGRESS NOTES
Outpatient Care Management   - Patient Assessment    Patient: Erasmo Regalado  MRN:  [id number]  Date of Service:  6/11/2025  Completed by:  Sharita Moore LCSW  Referral Date: 06/11/2025    Reason for Visit   Patient presents with    Social Work Assessment     6/11/2025       Brief Summary:  received a referral from [facility] RN for the following HR SW psychosocial needs: issues with paperwork for work and insurance. Spoke with patient who placed SW on speakerphone so his mother, Katt, could participate in the conversation. Patient explained that he is having issues with getting his paperwork needed for work and insurance corrected; states gastro MD initially filled out the forms but some corrections need to be made. Per chart, it appears MD office messaged patient on 5/27 advising that he come by their office with the paperwork; patient was unaware of the message and verbalized understanding. Patient's mother voiced frustration with feeling like they are not getting anywhere with the paperwork being filled out; advised another source that may be able to address their frustrations is Patient Advocacy; patient wrote down ph# for PA. Patient mother states patient's Medicaid application is pending. No further SW needs identified.

## 2025-06-16 ENCOUNTER — PATIENT MESSAGE (OUTPATIENT)
Dept: PRIMARY CARE CLINIC | Facility: CLINIC | Age: 25
End: 2025-06-16
Payer: COMMERCIAL

## 2025-06-16 NOTE — TELEPHONE ENCOUNTER
Called to schedule office visit but he does not have any insurance at this time.  He will call back when he does.

## 2025-08-07 ENCOUNTER — TELEPHONE (OUTPATIENT)
Dept: PRIMARY CARE CLINIC | Facility: CLINIC | Age: 25
End: 2025-08-07
Payer: COMMERCIAL

## 2025-08-07 NOTE — TELEPHONE ENCOUNTER
Copied from CRM #[id number]. Topic: General Inquiry - Patient Advice  >> Aug 7, 2025  1:52 PM Cordell wrote:  Type:  Needs Medical Advice    Who Called: Patient Mom    Symptoms (please be specific):    How long has patient had these symptoms:    Pharmacy name and phone #:    Would the patient rather a call back or a response via [facility]? Call back   Best Call Back Number: [phone]  Additional Information: Ms. Bolivar would like a call back to discuss her son apt. Mom says the office offer her son a sooner apt and he is on his way to it. Pt has a annual apt but his last annual was on 12/30/24.Please call to advise

## 2025-08-07 NOTE — TELEPHONE ENCOUNTER
Spoke to mom and she wanted to say the visit coming up is not an annual but a follow up. I updated the appt notes.    unknown

## 2025-08-11 ENCOUNTER — TELEPHONE (OUTPATIENT)
Dept: PRIMARY CARE CLINIC | Facility: CLINIC | Age: 25
End: 2025-08-11
Payer: COMMERCIAL

## 2025-08-11 ENCOUNTER — OFFICE VISIT (OUTPATIENT)
Dept: PRIMARY CARE CLINIC | Facility: CLINIC | Age: 25
End: 2025-08-11
Payer: COMMERCIAL

## 2025-08-11 VITALS
SYSTOLIC BLOOD PRESSURE: 120 MMHG | OXYGEN SATURATION: 98 % | BODY MASS INDEX: 22.09 KG/M2 | HEIGHT: 67 IN | RESPIRATION RATE: 18 BRPM | TEMPERATURE: 98 F | HEART RATE: 68 BPM | WEIGHT: 140.75 LBS | DIASTOLIC BLOOD PRESSURE: 72 MMHG

## 2025-08-11 DIAGNOSIS — K50.10 CROHN'S DISEASE OF LARGE INTESTINE WITHOUT COMPLICATION: ICD-10-CM

## 2025-08-11 DIAGNOSIS — E80.4 GILBERT'S SYNDROME: Primary | ICD-10-CM

## 2025-08-11 PROCEDURE — 1160F RVW MEDS BY RX/DR IN RCRD: CPT | Mod: CPTII,S$GLB,, | Performed by: INTERNAL MEDICINE

## 2025-08-11 PROCEDURE — 3008F BODY MASS INDEX DOCD: CPT | Mod: CPTII,S$GLB,, | Performed by: INTERNAL MEDICINE

## 2025-08-11 PROCEDURE — 99213 OFFICE O/P EST LOW 20 MIN: CPT | Mod: S$GLB,,, | Performed by: INTERNAL MEDICINE

## 2025-08-11 PROCEDURE — 3078F DIAST BP <80 MM HG: CPT | Mod: CPTII,S$GLB,, | Performed by: INTERNAL MEDICINE

## 2025-08-11 PROCEDURE — 99999 PR PBB SHADOW E&M-EST. PATIENT-LVL IV: CPT | Mod: PBBFAC,,, | Performed by: INTERNAL MEDICINE

## 2025-08-11 PROCEDURE — 1159F MED LIST DOCD IN RCRD: CPT | Mod: CPTII,S$GLB,, | Performed by: INTERNAL MEDICINE

## 2025-08-11 PROCEDURE — 3074F SYST BP LT 130 MM HG: CPT | Mod: CPTII,S$GLB,, | Performed by: INTERNAL MEDICINE
